# Patient Record
Sex: MALE | Race: OTHER | NOT HISPANIC OR LATINO | URBAN - METROPOLITAN AREA
[De-identification: names, ages, dates, MRNs, and addresses within clinical notes are randomized per-mention and may not be internally consistent; named-entity substitution may affect disease eponyms.]

---

## 2022-06-13 ENCOUNTER — HOSPITAL ENCOUNTER (INPATIENT)
Facility: HOSPITAL | Age: 42
LOS: 2 days | Discharge: HOME | DRG: 247 | End: 2022-06-15
Attending: EMERGENCY MEDICINE | Admitting: INTERNAL MEDICINE
Payer: COMMERCIAL

## 2022-06-13 DIAGNOSIS — R07.9 CHEST PAIN: ICD-10-CM

## 2022-06-13 DIAGNOSIS — Z72.0 TOBACCO ABUSE: Chronic | ICD-10-CM

## 2022-06-13 DIAGNOSIS — R73.9 HYPERGLYCEMIA, UNSPECIFIED: ICD-10-CM

## 2022-06-13 DIAGNOSIS — I21.3 ST ELEVATION MYOCARDIAL INFARCTION (STEMI), UNSPECIFIED ARTERY: ICD-10-CM

## 2022-06-13 DIAGNOSIS — I25.118 CORONARY ARTERY DISEASE OF NATIVE HEART WITH STABLE ANGINA PECTORIS, UNSPECIFIED VESSEL OR LESION TYPE: ICD-10-CM

## 2022-06-13 DIAGNOSIS — I25.10 CAD (CORONARY ARTERY DISEASE): ICD-10-CM

## 2022-06-13 DIAGNOSIS — I21.3 STEMI (ST ELEVATION MYOCARDIAL INFARCTION): ICD-10-CM

## 2022-06-13 DIAGNOSIS — I10 UNCONTROLLED HYPERTENSION: ICD-10-CM

## 2022-06-13 DIAGNOSIS — I21.02 ST ELEVATION MYOCARDIAL INFARCTION INVOLVING LEFT ANTERIOR DESCENDING (LAD) CORONARY ARTERY: ICD-10-CM

## 2022-06-13 LAB
ALBUMIN SERPL BCP-MCNC: 4 G/DL (ref 3.5–5.2)
ALBUMIN SERPL BCP-MCNC: 4.1 G/DL (ref 3.5–5.2)
ALP SERPL-CCNC: 61 U/L (ref 55–135)
ALP SERPL-CCNC: 65 U/L (ref 55–135)
ALT SERPL W/O P-5'-P-CCNC: 26 U/L (ref 10–44)
ALT SERPL W/O P-5'-P-CCNC: 30 U/L (ref 10–44)
AMPHET+METHAMPHET UR QL: NEGATIVE
ANION GAP SERPL CALC-SCNC: 12 MMOL/L (ref 8–16)
ANION GAP SERPL CALC-SCNC: 7 MMOL/L (ref 8–16)
AST SERPL-CCNC: 27 U/L (ref 10–40)
AST SERPL-CCNC: 82 U/L (ref 10–40)
BARBITURATES UR QL SCN>200 NG/ML: NEGATIVE
BASOPHILS # BLD AUTO: 0.04 K/UL (ref 0–0.2)
BASOPHILS # BLD AUTO: 0.04 K/UL (ref 0–0.2)
BASOPHILS NFR BLD: 0.3 % (ref 0–1.9)
BASOPHILS NFR BLD: 0.5 % (ref 0–1.9)
BENZODIAZ UR QL SCN>200 NG/ML: ABNORMAL
BILIRUB SERPL-MCNC: 0.3 MG/DL (ref 0.1–1)
BILIRUB SERPL-MCNC: 0.6 MG/DL (ref 0.1–1)
BILIRUB UR QL STRIP: NEGATIVE
BNP SERPL-MCNC: 55 PG/ML (ref 0–99)
BNP SERPL-MCNC: 80 PG/ML (ref 0–99)
BUN SERPL-MCNC: 13 MG/DL (ref 6–20)
BUN SERPL-MCNC: 16 MG/DL (ref 6–20)
BZE UR QL SCN: NEGATIVE
CALCIUM SERPL-MCNC: 9.5 MG/DL (ref 8.7–10.5)
CALCIUM SERPL-MCNC: 9.9 MG/DL (ref 8.7–10.5)
CANNABINOIDS UR QL SCN: NEGATIVE
CHLORIDE SERPL-SCNC: 104 MMOL/L (ref 95–110)
CHLORIDE SERPL-SCNC: 107 MMOL/L (ref 95–110)
CLARITY UR: CLEAR
CO2 SERPL-SCNC: 22 MMOL/L (ref 23–29)
CO2 SERPL-SCNC: 26 MMOL/L (ref 23–29)
COLOR UR: YELLOW
CREAT SERPL-MCNC: 0.8 MG/DL (ref 0.5–1.4)
CREAT SERPL-MCNC: 0.9 MG/DL (ref 0.5–1.4)
CREAT UR-MCNC: 18 MG/DL (ref 23–375)
DIFFERENTIAL METHOD: ABNORMAL
DIFFERENTIAL METHOD: NORMAL
EOSINOPHIL # BLD AUTO: 0.1 K/UL (ref 0–0.5)
EOSINOPHIL # BLD AUTO: 0.4 K/UL (ref 0–0.5)
EOSINOPHIL NFR BLD: 1.1 % (ref 0–8)
EOSINOPHIL NFR BLD: 5.4 % (ref 0–8)
ERYTHROCYTE [DISTWIDTH] IN BLOOD BY AUTOMATED COUNT: 12.4 % (ref 11.5–14.5)
ERYTHROCYTE [DISTWIDTH] IN BLOOD BY AUTOMATED COUNT: 12.5 % (ref 11.5–14.5)
EST. GFR  (AFRICAN AMERICAN): >60 ML/MIN/1.73 M^2
EST. GFR  (AFRICAN AMERICAN): >60 ML/MIN/1.73 M^2
EST. GFR  (NON AFRICAN AMERICAN): >60 ML/MIN/1.73 M^2
EST. GFR  (NON AFRICAN AMERICAN): >60 ML/MIN/1.73 M^2
GLUCOSE SERPL-MCNC: 101 MG/DL (ref 70–110)
GLUCOSE SERPL-MCNC: 222 MG/DL (ref 70–110)
GLUCOSE UR QL STRIP: NEGATIVE
HCT VFR BLD AUTO: 43.9 % (ref 40–54)
HCT VFR BLD AUTO: 44.9 % (ref 40–54)
HGB BLD-MCNC: 14.5 G/DL (ref 14–18)
HGB BLD-MCNC: 14.9 G/DL (ref 14–18)
HGB UR QL STRIP: ABNORMAL
IMM GRANULOCYTES # BLD AUTO: 0.02 K/UL (ref 0–0.04)
IMM GRANULOCYTES # BLD AUTO: 0.06 K/UL (ref 0–0.04)
IMM GRANULOCYTES NFR BLD AUTO: 0.3 % (ref 0–0.5)
IMM GRANULOCYTES NFR BLD AUTO: 0.5 % (ref 0–0.5)
KETONES UR QL STRIP: NEGATIVE
LEUKOCYTE ESTERASE UR QL STRIP: NEGATIVE
LYMPHOCYTES # BLD AUTO: 1.6 K/UL (ref 1–4.8)
LYMPHOCYTES # BLD AUTO: 2.8 K/UL (ref 1–4.8)
LYMPHOCYTES NFR BLD: 12.8 % (ref 18–48)
LYMPHOCYTES NFR BLD: 34.8 % (ref 18–48)
MCH RBC QN AUTO: 29.9 PG (ref 27–31)
MCH RBC QN AUTO: 29.9 PG (ref 27–31)
MCHC RBC AUTO-ENTMCNC: 33 G/DL (ref 32–36)
MCHC RBC AUTO-ENTMCNC: 33.2 G/DL (ref 32–36)
MCV RBC AUTO: 90 FL (ref 82–98)
MCV RBC AUTO: 91 FL (ref 82–98)
METHADONE UR QL SCN>300 NG/ML: NEGATIVE
MONOCYTES # BLD AUTO: 0.8 K/UL (ref 0.3–1)
MONOCYTES # BLD AUTO: 1 K/UL (ref 0.3–1)
MONOCYTES NFR BLD: 12 % (ref 4–15)
MONOCYTES NFR BLD: 6.1 % (ref 4–15)
NEUTROPHILS # BLD AUTO: 3.7 K/UL (ref 1.8–7.7)
NEUTROPHILS # BLD AUTO: 9.7 K/UL (ref 1.8–7.7)
NEUTROPHILS NFR BLD: 47 % (ref 38–73)
NEUTROPHILS NFR BLD: 79.2 % (ref 38–73)
NITRITE UR QL STRIP: NEGATIVE
NRBC BLD-RTO: 0 /100 WBC
NRBC BLD-RTO: 0 /100 WBC
OPIATES UR QL SCN: NEGATIVE
PCP UR QL SCN>25 NG/ML: NEGATIVE
PH UR STRIP: 7 [PH] (ref 5–8)
PLATELET # BLD AUTO: 241 K/UL (ref 150–450)
PLATELET # BLD AUTO: 262 K/UL (ref 150–450)
PMV BLD AUTO: 9.4 FL (ref 9.2–12.9)
PMV BLD AUTO: 9.9 FL (ref 9.2–12.9)
POC ACTIVATED CLOTTING TIME K: 213 SEC (ref 74–137)
POC ACTIVATED CLOTTING TIME K: 254 SEC (ref 74–137)
POC ACTIVATED CLOTTING TIME K: 265 SEC (ref 74–137)
POCT GLUCOSE: 81 MG/DL (ref 70–110)
POTASSIUM SERPL-SCNC: 3.6 MMOL/L (ref 3.5–5.1)
POTASSIUM SERPL-SCNC: 3.9 MMOL/L (ref 3.5–5.1)
PROT SERPL-MCNC: 7.1 G/DL (ref 6–8.4)
PROT SERPL-MCNC: 7.6 G/DL (ref 6–8.4)
PROT UR QL STRIP: NEGATIVE
RBC # BLD AUTO: 4.85 M/UL (ref 4.6–6.2)
RBC # BLD AUTO: 4.99 M/UL (ref 4.6–6.2)
SAMPLE: ABNORMAL
SARS-COV-2 RDRP RESP QL NAA+PROBE: NEGATIVE
SODIUM SERPL-SCNC: 138 MMOL/L (ref 136–145)
SODIUM SERPL-SCNC: 140 MMOL/L (ref 136–145)
SP GR UR STRIP: 1.01 (ref 1–1.03)
TOXICOLOGY INFORMATION: ABNORMAL
TROPONIN I SERPL DL<=0.01 NG/ML-MCNC: 0.81 NG/ML (ref 0–0.03)
TROPONIN I SERPL DL<=0.01 NG/ML-MCNC: 18.34 NG/ML (ref 0–0.03)
URN SPEC COLLECT METH UR: ABNORMAL
UROBILINOGEN UR STRIP-ACNC: NEGATIVE EU/DL
WBC # BLD AUTO: 12.28 K/UL (ref 3.9–12.7)
WBC # BLD AUTO: 7.9 K/UL (ref 3.9–12.7)

## 2022-06-13 PROCEDURE — 25000003 PHARM REV CODE 250: Performed by: INTERNAL MEDICINE

## 2022-06-13 PROCEDURE — 80307 DRUG TEST PRSMV CHEM ANLYZR: CPT | Performed by: EMERGENCY MEDICINE

## 2022-06-13 PROCEDURE — 85025 COMPLETE CBC W/AUTO DIFF WBC: CPT | Mod: 91 | Performed by: EMERGENCY MEDICINE

## 2022-06-13 PROCEDURE — 92929 PR STENT, ADD'L VESSEL: CPT | Mod: LD,,, | Performed by: INTERNAL MEDICINE

## 2022-06-13 PROCEDURE — 99223 PR INITIAL HOSPITAL CARE,LEVL III: ICD-10-PCS | Mod: ,,, | Performed by: INTERNAL MEDICINE

## 2022-06-13 PROCEDURE — C9606 PERC D-E COR REVASC W AMI S: HCPCS | Mod: LD | Performed by: INTERNAL MEDICINE

## 2022-06-13 PROCEDURE — 84484 ASSAY OF TROPONIN QUANT: CPT | Mod: 91 | Performed by: INTERNAL MEDICINE

## 2022-06-13 PROCEDURE — 92978 PR IVUS, CORONARY, 1ST VESSEL: ICD-10-PCS | Mod: 26,LD,, | Performed by: INTERNAL MEDICINE

## 2022-06-13 PROCEDURE — C1874 STENT, COATED/COV W/DEL SYS: HCPCS | Performed by: INTERNAL MEDICINE

## 2022-06-13 PROCEDURE — 96374 THER/PROPH/DIAG INJ IV PUSH: CPT

## 2022-06-13 PROCEDURE — 99285 EMERGENCY DEPT VISIT HI MDM: CPT | Mod: 25

## 2022-06-13 PROCEDURE — 93010 ELECTROCARDIOGRAM REPORT: CPT | Mod: ,,, | Performed by: INTERNAL MEDICINE

## 2022-06-13 PROCEDURE — 93010 EKG 12-LEAD: ICD-10-PCS | Mod: 76,,, | Performed by: INTERNAL MEDICINE

## 2022-06-13 PROCEDURE — C1725 CATH, TRANSLUMIN NON-LASER: HCPCS | Performed by: INTERNAL MEDICINE

## 2022-06-13 PROCEDURE — 96375 TX/PRO/DX INJ NEW DRUG ADDON: CPT

## 2022-06-13 PROCEDURE — 99223 1ST HOSP IP/OBS HIGH 75: CPT | Mod: ,,, | Performed by: INTERNAL MEDICINE

## 2022-06-13 PROCEDURE — 36415 COLL VENOUS BLD VENIPUNCTURE: CPT | Performed by: EMERGENCY MEDICINE

## 2022-06-13 PROCEDURE — C1894 INTRO/SHEATH, NON-LASER: HCPCS | Performed by: INTERNAL MEDICINE

## 2022-06-13 PROCEDURE — 27201423 OPTIME MED/SURG SUP & DEVICES STERILE SUPPLY: Performed by: INTERNAL MEDICINE

## 2022-06-13 PROCEDURE — C1887 CATHETER, GUIDING: HCPCS | Performed by: INTERNAL MEDICINE

## 2022-06-13 PROCEDURE — C1753 CATH, INTRAVAS ULTRASOUND: HCPCS | Performed by: INTERNAL MEDICINE

## 2022-06-13 PROCEDURE — 92941 PRQ TRLML REVSC TOT OCCL AMI: CPT | Mod: LD,,, | Performed by: INTERNAL MEDICINE

## 2022-06-13 PROCEDURE — 92978 ENDOLUMINL IVUS OCT C 1ST: CPT | Mod: 26,LD,, | Performed by: INTERNAL MEDICINE

## 2022-06-13 PROCEDURE — 20000000 HC ICU ROOM

## 2022-06-13 PROCEDURE — 36415 COLL VENOUS BLD VENIPUNCTURE: CPT | Performed by: INTERNAL MEDICINE

## 2022-06-13 PROCEDURE — 92941 PR AMI ANY METHOD: ICD-10-PCS | Mod: LD,,, | Performed by: INTERNAL MEDICINE

## 2022-06-13 PROCEDURE — 25500020 PHARM REV CODE 255: Performed by: INTERNAL MEDICINE

## 2022-06-13 PROCEDURE — 93458 PR CATH PLACE/CORON ANGIO, IMG SUPER/INTERP,W LEFT HEART VENTRICULOGRAPHY: ICD-10-PCS | Mod: 26,59,51, | Performed by: INTERNAL MEDICINE

## 2022-06-13 PROCEDURE — 93458 L HRT ARTERY/VENTRICLE ANGIO: CPT | Mod: 26,59,51, | Performed by: INTERNAL MEDICINE

## 2022-06-13 PROCEDURE — 99152 MOD SED SAME PHYS/QHP 5/>YRS: CPT | Mod: ,,, | Performed by: INTERNAL MEDICINE

## 2022-06-13 PROCEDURE — 93005 ELECTROCARDIOGRAM TRACING: CPT

## 2022-06-13 PROCEDURE — C1769 GUIDE WIRE: HCPCS | Performed by: INTERNAL MEDICINE

## 2022-06-13 PROCEDURE — U0002 COVID-19 LAB TEST NON-CDC: HCPCS | Performed by: EMERGENCY MEDICINE

## 2022-06-13 PROCEDURE — 99291 PR CRITICAL CARE, E/M 30-74 MINUTES: ICD-10-PCS | Mod: ,,, | Performed by: NURSE PRACTITIONER

## 2022-06-13 PROCEDURE — 25000242 PHARM REV CODE 250 ALT 637 W/ HCPCS: Performed by: EMERGENCY MEDICINE

## 2022-06-13 PROCEDURE — 99152 MOD SED SAME PHYS/QHP 5/>YRS: CPT | Performed by: INTERNAL MEDICINE

## 2022-06-13 PROCEDURE — 81003 URINALYSIS AUTO W/O SCOPE: CPT | Mod: 59 | Performed by: EMERGENCY MEDICINE

## 2022-06-13 PROCEDURE — 92978 ENDOLUMINL IVUS OCT C 1ST: CPT | Mod: LD | Performed by: INTERNAL MEDICINE

## 2022-06-13 PROCEDURE — 99152 PR MOD CONSCIOUS SEDATION, SAME PHYS, 5+ YRS, FIRST 15 MIN: ICD-10-PCS | Mod: ,,, | Performed by: INTERNAL MEDICINE

## 2022-06-13 PROCEDURE — 93458 L HRT ARTERY/VENTRICLE ANGIO: CPT | Mod: 59 | Performed by: INTERNAL MEDICINE

## 2022-06-13 PROCEDURE — 80053 COMPREHEN METABOLIC PANEL: CPT | Mod: 91 | Performed by: EMERGENCY MEDICINE

## 2022-06-13 PROCEDURE — 85347 COAGULATION TIME ACTIVATED: CPT | Performed by: INTERNAL MEDICINE

## 2022-06-13 PROCEDURE — 84484 ASSAY OF TROPONIN QUANT: CPT | Performed by: EMERGENCY MEDICINE

## 2022-06-13 PROCEDURE — 99291 CRITICAL CARE FIRST HOUR: CPT | Mod: ,,, | Performed by: NURSE PRACTITIONER

## 2022-06-13 PROCEDURE — 25000003 PHARM REV CODE 250: Performed by: EMERGENCY MEDICINE

## 2022-06-13 PROCEDURE — 63600175 PHARM REV CODE 636 W HCPCS: Mod: JG

## 2022-06-13 PROCEDURE — 93010 ELECTROCARDIOGRAM REPORT: CPT | Mod: 76,,, | Performed by: INTERNAL MEDICINE

## 2022-06-13 PROCEDURE — 92929 PR STENT, ADD'L VESSEL: ICD-10-PCS | Mod: LD,,, | Performed by: INTERNAL MEDICINE

## 2022-06-13 PROCEDURE — 83880 ASSAY OF NATRIURETIC PEPTIDE: CPT | Mod: 91 | Performed by: EMERGENCY MEDICINE

## 2022-06-13 PROCEDURE — 63600175 PHARM REV CODE 636 W HCPCS: Performed by: INTERNAL MEDICINE

## 2022-06-13 PROCEDURE — 25000003 PHARM REV CODE 250: Performed by: NURSE PRACTITIONER

## 2022-06-13 PROCEDURE — 99153 MOD SED SAME PHYS/QHP EA: CPT | Performed by: INTERNAL MEDICINE

## 2022-06-13 DEVICE — STENT RONYX40012UX RESOLUTE ONYX 4.00X12
Type: IMPLANTABLE DEVICE | Site: ARTERIAL | Status: FUNCTIONAL
Brand: RESOLUTE ONYX™

## 2022-06-13 DEVICE — STENT RONYX35030UX RESOLUTE ONYX 3.50X30
Type: IMPLANTABLE DEVICE | Site: ARTERIAL | Status: FUNCTIONAL
Brand: RESOLUTE ONYX™

## 2022-06-13 DEVICE — STENT RONYX30018UX RESOLUTE ONYX 3.00X18
Type: IMPLANTABLE DEVICE | Site: HEART | Status: FUNCTIONAL
Brand: RESOLUTE ONYX™

## 2022-06-13 RX ORDER — ENOXAPARIN SODIUM 100 MG/ML
40 INJECTION SUBCUTANEOUS EVERY 24 HOURS
Status: DISCONTINUED | OUTPATIENT
Start: 2022-06-14 | End: 2022-06-15 | Stop reason: HOSPADM

## 2022-06-13 RX ORDER — TIROFIBAN HYDROCHLORIDE 50 UG/ML
INJECTION INTRAVENOUS
Status: DISCONTINUED | OUTPATIENT
Start: 2022-06-13 | End: 2022-06-14

## 2022-06-13 RX ORDER — METOPROLOL TARTRATE 1 MG/ML
5 INJECTION, SOLUTION INTRAVENOUS
Status: COMPLETED | OUTPATIENT
Start: 2022-06-13 | End: 2022-06-13

## 2022-06-13 RX ORDER — BISACODYL 10 MG
10 SUPPOSITORY, RECTAL RECTAL DAILY PRN
Status: DISCONTINUED | OUTPATIENT
Start: 2022-06-13 | End: 2022-06-15 | Stop reason: HOSPADM

## 2022-06-13 RX ORDER — HEPARIN SODIUM 1000 [USP'U]/ML
INJECTION INTRAVENOUS; SUBCUTANEOUS
Status: DISCONTINUED | OUTPATIENT
Start: 2022-06-13 | End: 2022-06-13 | Stop reason: HOSPADM

## 2022-06-13 RX ORDER — FAMOTIDINE 20 MG/1
20 TABLET, FILM COATED ORAL 2 TIMES DAILY
Status: DISCONTINUED | OUTPATIENT
Start: 2022-06-13 | End: 2022-06-15 | Stop reason: HOSPADM

## 2022-06-13 RX ORDER — FENTANYL CITRATE 50 UG/ML
INJECTION, SOLUTION INTRAMUSCULAR; INTRAVENOUS
Status: DISCONTINUED | OUTPATIENT
Start: 2022-06-13 | End: 2022-06-13 | Stop reason: HOSPADM

## 2022-06-13 RX ORDER — IBUPROFEN 200 MG
16 TABLET ORAL
Status: DISCONTINUED | OUTPATIENT
Start: 2022-06-13 | End: 2022-06-15 | Stop reason: HOSPADM

## 2022-06-13 RX ORDER — HEPARIN SODIUM 5000 [USP'U]/ML
4000 INJECTION, SOLUTION INTRAVENOUS; SUBCUTANEOUS
Status: COMPLETED | OUTPATIENT
Start: 2022-06-13 | End: 2022-06-13

## 2022-06-13 RX ORDER — IODIXANOL 320 MG/ML
INJECTION, SOLUTION INTRAVASCULAR
Status: DISCONTINUED | OUTPATIENT
Start: 2022-06-13 | End: 2022-06-13 | Stop reason: HOSPADM

## 2022-06-13 RX ORDER — MUPIROCIN 20 MG/G
OINTMENT TOPICAL 2 TIMES DAILY
Status: DISCONTINUED | OUTPATIENT
Start: 2022-06-13 | End: 2022-06-15 | Stop reason: HOSPADM

## 2022-06-13 RX ORDER — ONDANSETRON 8 MG/1
8 TABLET, ORALLY DISINTEGRATING ORAL EVERY 8 HOURS PRN
Status: DISCONTINUED | OUTPATIENT
Start: 2022-06-13 | End: 2022-06-15 | Stop reason: HOSPADM

## 2022-06-13 RX ORDER — MIDAZOLAM HYDROCHLORIDE 1 MG/ML
INJECTION, SOLUTION INTRAMUSCULAR; INTRAVENOUS
Status: DISCONTINUED | OUTPATIENT
Start: 2022-06-13 | End: 2022-06-13 | Stop reason: HOSPADM

## 2022-06-13 RX ORDER — NITROGLYCERIN 0.4 MG/1
0.4 TABLET SUBLINGUAL EVERY 5 MIN PRN
Status: DISCONTINUED | OUTPATIENT
Start: 2022-06-13 | End: 2022-06-15 | Stop reason: HOSPADM

## 2022-06-13 RX ORDER — NITROGLYCERIN 5 MG/ML
INJECTION, SOLUTION INTRAVENOUS
Status: DISCONTINUED | OUTPATIENT
Start: 2022-06-13 | End: 2022-06-13 | Stop reason: HOSPADM

## 2022-06-13 RX ORDER — METOPROLOL TARTRATE 25 MG/1
25 TABLET, FILM COATED ORAL 2 TIMES DAILY
Status: DISCONTINUED | OUTPATIENT
Start: 2022-06-13 | End: 2022-06-15 | Stop reason: HOSPADM

## 2022-06-13 RX ORDER — TIROFIBAN HYDROCHLORIDE 50 UG/ML
0.15 INJECTION INTRAVENOUS CONTINUOUS
Status: DISCONTINUED | OUTPATIENT
Start: 2022-06-13 | End: 2022-06-14

## 2022-06-13 RX ORDER — SODIUM CHLORIDE 9 MG/ML
INJECTION, SOLUTION INTRAVENOUS CONTINUOUS
Status: DISCONTINUED | OUTPATIENT
Start: 2022-06-13 | End: 2022-06-13

## 2022-06-13 RX ORDER — DOCUSATE SODIUM 100 MG/1
100 CAPSULE, LIQUID FILLED ORAL EVERY OTHER DAY
Status: DISCONTINUED | OUTPATIENT
Start: 2022-06-14 | End: 2022-06-15 | Stop reason: HOSPADM

## 2022-06-13 RX ORDER — GLUCAGON 1 MG
1 KIT INJECTION
Status: DISCONTINUED | OUTPATIENT
Start: 2022-06-13 | End: 2022-06-15 | Stop reason: HOSPADM

## 2022-06-13 RX ORDER — INSULIN ASPART 100 [IU]/ML
1-10 INJECTION, SOLUTION INTRAVENOUS; SUBCUTANEOUS
Status: DISCONTINUED | OUTPATIENT
Start: 2022-06-13 | End: 2022-06-15 | Stop reason: HOSPADM

## 2022-06-13 RX ORDER — IBUPROFEN 200 MG
24 TABLET ORAL
Status: DISCONTINUED | OUTPATIENT
Start: 2022-06-13 | End: 2022-06-15 | Stop reason: HOSPADM

## 2022-06-13 RX ORDER — ACETAMINOPHEN 325 MG/1
650 TABLET ORAL EVERY 4 HOURS PRN
Status: DISCONTINUED | OUTPATIENT
Start: 2022-06-13 | End: 2022-06-15 | Stop reason: HOSPADM

## 2022-06-13 RX ORDER — ASPIRIN 81 MG/1
81 TABLET ORAL DAILY
Status: DISCONTINUED | OUTPATIENT
Start: 2022-06-14 | End: 2022-06-15 | Stop reason: HOSPADM

## 2022-06-13 RX ORDER — LABETALOL HYDROCHLORIDE 5 MG/ML
10 INJECTION, SOLUTION INTRAVENOUS
Status: DISCONTINUED | OUTPATIENT
Start: 2022-06-13 | End: 2022-06-13

## 2022-06-13 RX ORDER — SODIUM CHLORIDE 9 MG/ML
INJECTION, SOLUTION INTRAVENOUS CONTINUOUS
Status: DISCONTINUED | OUTPATIENT
Start: 2022-06-13 | End: 2022-06-14

## 2022-06-13 RX ADMIN — FAMOTIDINE 20 MG: 20 TABLET ORAL at 08:06

## 2022-06-13 RX ADMIN — HEPARIN SODIUM 4000 UNITS: 5000 INJECTION, SOLUTION INTRAVENOUS; SUBCUTANEOUS at 04:06

## 2022-06-13 RX ADMIN — METOPROLOL TARTRATE 5 MG: 1 INJECTION, SOLUTION INTRAVENOUS at 04:06

## 2022-06-13 RX ADMIN — METOPROLOL TARTRATE 25 MG: 25 TABLET, FILM COATED ORAL at 08:06

## 2022-06-13 RX ADMIN — NITROGLYCERIN 0.4 MG: 0.4 TABLET SUBLINGUAL at 04:06

## 2022-06-13 RX ADMIN — SODIUM CHLORIDE 1000 ML: 0.9 INJECTION, SOLUTION INTRAVENOUS at 04:06

## 2022-06-13 RX ADMIN — MUPIROCIN: 20 OINTMENT TOPICAL at 08:06

## 2022-06-13 RX ADMIN — SODIUM CHLORIDE: 0.9 INJECTION, SOLUTION INTRAVENOUS at 07:06

## 2022-06-13 NOTE — ED NOTES
Called pt's wife, Luther ([phone]). Luther's daughter answered and stated that they were in the surgery/radiology waiting room. This RN and Dr. Prieto went to surgery waiting room and spoke with pt's wife and family to provide update. HANNAH  (mandarin) was utilized -  [id number].

## 2022-06-13 NOTE — SUBJECTIVE & OBJECTIVE
History reviewed. No pertinent past medical history.    History reviewed. No pertinent surgical history.    Review of patient's allergies indicates:  No Known Allergies    No current facility-administered medications on file prior to encounter.     No current outpatient medications on file prior to encounter.     Family History       Problem Relation (Age of Onset)    Heart disease Mother          Tobacco Use    Smoking status: Current Every Day Smoker     Types: Cigarettes    Smokeless tobacco: Never Used   Substance and Sexual Activity    Alcohol use: Not on file    Drug use: Not on file    Sexual activity: Not on file     Review of Systems   Constitutional: Positive for malaise/fatigue.   Eyes:  Negative for blurred vision.   Cardiovascular:  Positive for chest pain. Negative for claudication, cyanosis, dyspnea on exertion, irregular heartbeat, leg swelling, near-syncope, orthopnea, palpitations and paroxysmal nocturnal dyspnea.   Respiratory:  Negative for cough, hemoptysis and shortness of breath.    Hematologic/Lymphatic: Negative for bleeding problem. Does not bruise/bleed easily.   Skin:  Negative for dry skin and itching.   Musculoskeletal:  Negative for falls, muscle weakness and myalgias.   Gastrointestinal:  Negative for abdominal pain, diarrhea, heartburn, hematemesis, hematochezia and melena.   Genitourinary:  Negative for flank pain and hematuria.   Neurological:  Negative for dizziness, focal weakness, headaches, light-headedness, numbness, paresthesias, seizures and weakness.   Psychiatric/Behavioral:  Negative for altered mental status and memory loss. The patient is not nervous/anxious.    Allergic/Immunologic: Negative for hives.   Objective:     Vital Signs (Most Recent):  Pulse: (!) 116 (06/13/22 1619)  Resp: (!) 24 (06/13/22 1619)  BP: (!) 176/101 (06/13/22 1619)  SpO2: 100 % (06/13/22 1619)   Vital Signs (24h Range):  Pulse:  [116] 116  Resp:  [24] 24  SpO2:  [100 %] 100 %  BP: (176)/(101)  176/101     Weight: 73.9 kg (162 lb 14.7 oz)  Body mass index is 24.06 kg/m².    SpO2: 100 %  O2 Device (Oxygen Therapy): nasal cannula    No intake or output data in the 24 hours ending 06/13/22 1757    Lines/Drains/Airways       Peripheral Intravenous Line  Duration                  Peripheral IV - Single Lumen 06/13/22 1600 18 G Left Antecubital <1 day         Peripheral IV - Single Lumen 06/13/22 1616 20 G Right Hand <1 day                    Physical Exam  Vitals and nursing note reviewed.   Constitutional:       General: He is in acute distress.      Appearance: He is well-developed. He is ill-appearing. He is not diaphoretic.   HENT:      Head: Normocephalic and atraumatic.   Eyes:      General:         Right eye: No discharge.         Left eye: No discharge.      Pupils: Pupils are equal, round, and reactive to light.   Neck:      Thyroid: No thyromegaly.      Vascular: No JVD.   Cardiovascular:      Rate and Rhythm: Normal rate and regular rhythm.      Pulses: Intact distal pulses.      Heart sounds: Normal heart sounds. No murmur heard.    No friction rub. No gallop.   Pulmonary:      Effort: Pulmonary effort is normal. No respiratory distress.      Breath sounds: Normal breath sounds. No wheezing or rales.   Chest:      Chest wall: No tenderness.   Abdominal:      General: Bowel sounds are normal. There is no distension.      Palpations: Abdomen is soft.      Tenderness: There is no abdominal tenderness.   Musculoskeletal:         General: Normal range of motion.      Cervical back: Neck supple.   Skin:     General: Skin is warm and dry.      Findings: No erythema or rash.   Neurological:      Mental Status: He is alert and oriented to person, place, and time.      Cranial Nerves: No cranial nerve deficit.   Psychiatric:         Behavior: Behavior normal.       Significant Labs:   Recent Lab Results         06/13/22  1728   06/13/22  1708   06/13/22  1647   06/13/22  1626        Albumin       4.1        Alkaline Phosphatase       61       ALT       26       Anion Gap       12       AST       27       Baso #       0.04       Basophil %       0.5       BILIRUBIN TOTAL       0.3  Comment: For infants and newborns, interpretation of results should be based  on gestational age, weight and in agreement with clinical  observations.    Premature Infant recommended reference ranges:  Up to 24 hours.............<8.0 mg/dL  Up to 48 hours............<12.0 mg/dL  3-5 days..................<15.0 mg/dL  6-29 days.................<15.0 mg/dL         BNP       55  Comment: Values of less than 100 pg/ml are consistent with non-CHF populations.       BUN       16       Calcium       9.9       Chloride       104       CO2       22       Creatinine       0.8       Differential Method       Automated       eGFR if        >60       eGFR if non        >60  Comment: Calculation used to obtain the estimated glomerular filtration  rate (eGFR) is the CKD-EPI equation.          Eos #       0.4       Eosinophil %       5.4       Glucose       222       Gran # (ANC)       3.7       Gran %       47.0       Hematocrit       44.9       Hemoglobin       14.9       Immature Grans (Abs)       0.02  Comment: Mild elevation in immature granulocytes is non specific and   can be seen in a variety of conditions including stress response,   acute inflammation, trauma and pregnancy. Correlation with other   laboratory and clinical findings is essential.         Immature Granulocytes       0.3       Lymph #       2.8       Lymph %       34.8       MCH       29.9       MCHC       33.2       MCV       90       Mono #       1.0       Mono %       12.0       MPV       9.9       nRBC       0       Platelets       241       POC ACTIVATED CLOTTING TIME K 254   213   265         Potassium       3.6       PROTEIN TOTAL       7.6       RBC       4.99       RDW       12.5       Sample unknown   unknown   unknown         SARS-CoV-2 RNA,  Amplification, Qual       Negative  Comment: This test utilizes isothermal nucleic acid amplification   technology to detect the SARS-CoV-2 RdRp nucleic acid segment.   The analytical sensitivity (limit of detection) is 125 genome   equivalents/mL.     A POSITIVE result implies infection with the SARS-CoV-2 virus;  the patient is presumed to be contagious.    A NEGATIVE result means that SARS-CoV-2 nucleic acids are not  present above the limit of detection. A NEGATIVE result should be   treated as presumptive. It does not rule out the possibility of   COVID-19 and should not be the sole basis for treatment decisions.   If COVID-19 is strongly suspected based on clinical and exposure   history, re-testing using an alternate molecular assay should be   considered.       This test is only for use under the Food and Drug   Administration s Emergency Use Authorization (EUA).   Commercial kits are provided by [employer].   Performance characteristics of the EUA have been independently  verified by [facility] Department of  Pathology and Laboratory Medicine.   _________________________________________________________________  The ID NOW COVID-19 Letter of Authorization, along with the   authorized Fact Sheet for Healthcare Providers, the authorized Fact  Sheet for Patients, and authorized labeling are available on the FDA   website:  www.fda.gov/MedicalDevices/Safety/EmergencySituations/dyb948913.htm         Sodium       138       Troponin I       0.807  Comment: The reference interval for Troponin I represents the 99th percentile   cutoff   for our facility and is consistent with 3rd generation assay   performance.         WBC       7.90               Significant Imaging: X-Ray: CXR: X-Ray Chest 1 View (CXR): No results found for this visit on 06/13/22.

## 2022-06-13 NOTE — Clinical Note
The catheter was inserted into the ostium   right coronary artery. Hemodynamics were performed.  Over guidewire

## 2022-06-13 NOTE — CONSULTS
[facility] - Intensive Care ([facility])  Critical Care Medicine  Consult Note    Patient Name: Kumar Rodriguez  MRN: [id number]  Admission Date: 6/13/2022  Hospital Length of Stay: 0 days  Code Status: No Order  Attending Physician: Thee Nichols MD   Primary Care Provider: Primary Doctor No   Principal Problem: ST elevation myocardial infarction involving left anterior descending (LAD) coronary artery    [unfilled]  Subjective:     HPI:  Mr Rodriguez is a 42 yo male with a PMH of tobacco abuse awakened at 0300 hr with sternal CP and presented this afternoon to [facility] given  mg and transferred via EMS to [facility] arriving about 1618 hr with EKG en route indicative of acute STEMI.  STEMI also on EKG in ED with /101.  The pt stated at 3AM this morning he woke up feeling like his chest was tight. He stated the medicine at [facility] helped elevate his pain and rated it 2 out of 10.  Symptoms are constant and moderate in severity. No mitigating or exacerbating factors reported. Associated sxs include none.  The pt stated he has no previous cardiac hx but reported his mother had a heart attack.  Cards consulted and taken emergently for LHC having stents X 2 to LAD and 1 to diagonal then admitted to ICU.       Hospital/ICU Course:  6/13 - Admitted to ICU from cath lab fully AA&OX3 in no distress and CP free on Aggrastat infusion and VSS supine in bed with Cath sheath still in place      History reviewed. No pertinent past medical history.    History reviewed. No pertinent surgical history.    Review of patient's allergies indicates:  No Known Allergies    Family History       Problem Relation (Age of Onset)    Heart disease Mother          Tobacco Use    Smoking status: Current Every Day Smoker     Types: Cigarettes    Smokeless tobacco: Never Used   Substance and Sexual Activity    Alcohol use: Not on file    Drug use: Not on file    Sexual activity: Not on file         Review of Systems    Constitutional:  Positive for fatigue. Negative for appetite change and diaphoresis.   HENT:  Negative for congestion.    Eyes:  Negative for discharge.   Respiratory:  Negative for cough, chest tightness, shortness of breath and wheezing.    Cardiovascular:  Negative for chest pain, palpitations and leg swelling.   Gastrointestinal:  Negative for abdominal distention, abdominal pain, blood in stool, nausea and vomiting.   Endocrine: Negative for polydipsia and polyuria.   Genitourinary:  Negative for difficulty urinating.   Musculoskeletal:  Negative for arthralgias.   Skin:  Negative for color change.   Allergic/Immunologic: Negative for immunocompromised state.   Neurological:  Negative for dizziness, weakness and headaches.   Hematological:  Does not bruise/bleed easily.   Psychiatric/Behavioral:  Negative for agitation, behavioral problems and confusion. The patient is nervous/anxious.    Objective:     Vital Signs (Most Recent):  Pulse: (!) 116 (06/13/22 1619)  Resp: (!) 24 (06/13/22 1619)  BP: (!) 176/101 (06/13/22 1619)  SpO2: 100 % (06/13/22 1619)   Vital Signs (24h Range):  Pulse:  [116] 116  Resp:  [24] 24  SpO2:  [100 %] 100 %  BP: (176)/(101) 176/101     Weight: 73.9 kg (162 lb 14.7 oz)  Body mass index is 24.06 kg/m².    No intake or output data in the 24 hours ending 06/13/22 1832    Physical Exam  Vitals and nursing note reviewed.   Constitutional:       General: He is awake. He is not in acute distress.     Appearance: Normal appearance. He is well-developed. He is not ill-appearing or toxic-appearing.      Interventions: He is not intubated.  HENT:      Head: Normocephalic and atraumatic.      Mouth/Throat:      Mouth: Mucous membranes are moist.   Eyes:      General: Lids are normal.      Pupils: Pupils are equal, round, and reactive to light.   Neck:      Trachea: Trachea normal.   Cardiovascular:      Rate and Rhythm: Normal rate and regular rhythm.      Pulses:           Radial pulses are 3+  on the right side and 3+ on the left side.        Dorsalis pedis pulses are 3+ on the right side and 3+ on the left side.      Heart sounds: Normal heart sounds.   Pulmonary:      Effort: Pulmonary effort is normal. No tachypnea, accessory muscle usage or respiratory distress. He is not intubated.      Breath sounds: Normal breath sounds.   Chest:      Chest wall: No deformity or tenderness.   Abdominal:      General: Bowel sounds are increased. There is no distension.      Palpations: Abdomen is soft.      Tenderness: There is no abdominal tenderness.   Genitourinary:     Penis: Normal.    Musculoskeletal:         General: Normal range of motion.      Cervical back: Normal range of motion.      Right lower leg: No edema.      Left lower leg: No edema.      Right foot: No deformity.      Left foot: No deformity.   Lymphadenopathy:      Cervical: No cervical adenopathy.   Skin:     General: Skin is warm and dry.      Capillary Refill: Capillary refill takes less than 2 seconds.      Findings: No rash.   Neurological:      General: No focal deficit present.      Mental Status: He is alert and oriented to person, place, and time.   Psychiatric:         Attention and Perception: Attention normal.         Mood and Affect: Mood normal.         Speech: Speech normal.         Behavior: Behavior normal. Behavior is cooperative.         Thought Content: Thought content normal.         Cognition and Memory: Cognition normal.         Judgment: Judgment normal.         Lines/Drains/Airways       Peripheral Intravenous Line  Duration                  Peripheral IV - Single Lumen 06/13/22 1600 18 G Left Antecubital <1 day         Peripheral IV - Single Lumen 06/13/22 1616 20 G Right Hand <1 day                    Significant Labs:    CBC/Anemia Profile:  Recent Labs   Lab 06/13/22  1626   WBC 7.90   HGB 14.9   HCT 44.9      MCV 90   RDW 12.5        Chemistries:  Recent Labs   Lab 06/13/22  1626      K 3.6       CO2 22*   BUN 16   CREATININE 0.8   CALCIUM 9.9   ALBUMIN 4.1   PROT 7.6   BILITOT 0.3   ALKPHOS 61   ALT 26   AST 27       Troponin:   Recent Labs   Lab 06/13/22  1626   TROPONINI 0.807*     All pertinent labs within the past 24 hours have been reviewed.        Assessment/Plan:     Cardiac/Vascular  * ST elevation myocardial infarction involving left anterior descending (LAD) coronary artery  Cards managing  S/P LHC with stents X 2 to LAD and 1 to diagonal   CP free post PCI  On Aggrastat infusion  Cont ASA and Ticagrelor  Start Lopressor now and statin tomorrow  Check lipid panel  Cont ICU cardiac monitoring overnight  Trend troponin  Check CXR    Uncontrolled hypertension  SBP 130s post PCI  Add Lopressor now and ARB later if needed.     Endocrine  Hyperglycemia, unspecified  Glucose 222 on admit  Add SSI  Check A1C    Other  Tobacco abuse  Encouraged tobacco cessation       Preventive Measures and Monitoring:   Stress Ulcer: Pepcid  Nutrition: Cardiac diet  Glucose control: SSI  Bowel prophylaxis:Colace and PRN Dulcolax  DVT prophylaxis: LMWH/SCDs  Hx CAD on B-Blocker: Lopressor  Head of Bed/Reposition: Elevate HOB and turn Q1-2 hours   Early Mobility: bed rest overnight  Code Status: Full    Counseling/Consultation:I have discussed the care of this patient in detail with the bedside nursing staff and Dr. Salter and Dr. Nichols    Critical Care Time: 58 minutes  Critical secondary to Patient has a condition that poses threat to life and bodily function: Acute Myocardial Infarction     Critical care was time spent personally by me on the following activities: development of treatment plan with patient or surrogate and bedside caregivers, discussions with consultants, evaluation of patient's response to treatment, examination of patient, ordering and performing treatments and interventions, ordering and review of laboratory studies, ordering and review of radiographic studies, pulse oximetry, re-evaluation of  patient's condition. This critical care time did not overlap with that of any other provider or involve time for any procedures.    Thank you for your consult. I will follow-up with patient. Please contact us if you have any additional questions.     Eliud Zheng NP  Critical Care Medicine  [facility] - Intensive Care ([facility])

## 2022-06-13 NOTE — ASSESSMENT & PLAN NOTE
Cards managing  S/P LHC with stents X 2 to LAD and 1 to diagonal   CP free post PCI  On Aggrastat infusion  Cont ASA and Ticagrelor  Start Lopressor now and statin tomorrow  Check lipid panel  Cont ICU cardiac monitoring overnight  Trend troponin  Check CXR

## 2022-06-13 NOTE — ED PROVIDER NOTES
SCRIBE #1 NOTE: I, Ioana Tinoco, am scribing for, and in the presence of, No att. providers found. I have scribed the entire note.       History     Chief Complaint   Patient presents with    Chest Pain     Sternal CP since 0300; Seen at [facility] PTA and 324mg ASA. EKG en route and at [facility] read STEMI.     Review of patient's allergies indicates:  No Known Allergies      History of Present Illness     HPI    6/13/2022, 4:18 PM  History obtained from the patient      History of Present Illness: Kumar Rodriguez is a 41 y.o. male patient who presents to the Emergency Department for evaluation of a possible STEMI. While en route the pt had an EKG that read STEMI. The pt states at 3AM this morning he woke up feeling like his chest was tight. The pt states he went to [facility] and they gave him 324mg ASA for his chest pain. He states the medicine helped elevate his pain and rates it 2 out of 10.  Symptoms are constant and moderate in severity. No mitigating or exacerbating factors reported. Associated sxs include none. Patient denies any weakness, numbness, dizziness, fever, leg swelling, N/V/D, neck pain, and all other sxs at this time. The pt states he has no previous cardiac hx but reports his mother had a heart attack. The pt states they are a smoker but deny any drug usage. No further complaints or concerns at this time.       Arrival mode: Personal vehicle  EMS      PCP: Primary Doctor No        Past Medical History:  Past Medical History:   Diagnosis Date    High cholesterol     HTN (hypertension)     Myocardial infarction        Past Surgical History:  Past Surgical History:   Procedure Laterality Date    CARDIAC CATHETERIZATION      LEFT HEART CATHETERIZATION Left 06/13/2022    Procedure: CATHETERIZATION, HEART, LEFT;  Surgeon: Thee Nichols MD;  Location: [facility] CATH LAB;  Service: Cardiology;  Laterality: Left;         Family History:  Family History   Problem Relation Age of Onset    Heart disease Mother        Social  History:  Social History     Tobacco Use    Smoking status: Former Smoker     Types: Cigarettes    Smokeless tobacco: Never Used   Substance and Sexual Activity    Alcohol use: Not Currently    Drug use: Not on file    Sexual activity: Not on file        Review of Systems     Review of Systems   Constitutional: Negative for fever.   HENT: Negative for sore throat.    Respiratory: Positive for chest tightness. Negative for shortness of breath.    Cardiovascular: Positive for chest pain. Negative for leg swelling.   Gastrointestinal: Negative for diarrhea, nausea and vomiting.   Genitourinary: Negative for dysuria.   Musculoskeletal: Negative for back pain and neck pain.   Skin: Negative for rash.   Neurological: Negative for dizziness, weakness and numbness.   Hematological: Does not bruise/bleed easily.   All other systems reviewed and are negative.     Physical Exam     Initial Vitals   BP Pulse Resp Temp SpO2   06/13/22 1619 06/13/22 1619 06/13/22 1619 06/13/22 1915 06/13/22 1619   (!) 176/101 (!) 116 (!) 24 98.4 °F (36.9 °C) 100 %      MAP       --                 Physical Exam  Nursing Notes and Vital Signs Reviewed.  Constitutional: Patient is in no apparent distress.  Head: Atraumatic. Normocephalic.  Eyes: PERRL. EOM intact. Conjunctivae are not pale. No scleral icterus.  ENT: Mucous membranes are moist. Oropharynx is clear and symmetric.    Neck: Supple. Full ROM. No lymphadenopathy.  Cardiovascular: Tachycardic. Regular rhythm. No murmurs, rubs, or gallops. Distal pulses are 2+ and symmetric.  Pulmonary/Chest: No respiratory distress. Clear to auscultation bilaterally. No wheezing or rales.  Abdominal: Soft and non-distended.  There is no tenderness.  No rebound, guarding, or rigidity. Good bowel sounds.  Genitourinary: No CVA tenderness  Musculoskeletal: Moves all extremities. No obvious deformities. No edema. No calf tenderness.  Skin: Warm and dry.  Neurological:  Alert, awake, and appropriate.   Normal speech.  No acute focal neurological deficits are appreciated.  Psychiatric: Normal affect. Good eye contact. Appropriate in content.     ED Course   Critical Care    Date/Time: 6/13/2022 6:21 PM  Performed by: Roberto Prieto Jr., MD  Authorized by: Roberto Prieto Jr., MD   Direct patient critical care time: 10 minutes  Additional history critical care time: 10 minutes  Ordering / reviewing critical care time: 10 minutes  Documentation critical care time: 10 minutes  Consulting other physicians critical care time: 10 minutes  Consult with family critical care time: 10 minutes  Total critical care time (exclusive of procedural time) : 60 minutes  Critical care time was exclusive of separately billable procedures and treating other patients and teaching time.  Critical care was necessary to treat or prevent imminent or life-threatening deterioration of the following conditions: cardiac failure and circulatory failure (STEMI).  Critical care was time spent personally by me on the following activities: blood draw for specimens, development of treatment plan with patient or surrogate, discussions with consultants, interpretation of cardiac output measurements, evaluation of patient's response to treatment, examination of patient, obtaining history from patient or surrogate, ordering and performing treatments and interventions, ordering and review of laboratory studies, ordering and review of radiographic studies, pulse oximetry, re-evaluation of patient's condition and review of old charts.        ED Vital Signs:  Vitals:    06/14/22 0740 06/14/22 1200 06/14/22 1600 06/14/22 1726   BP: 131/85 127/66 122/74 124/84   Pulse: 77  74 78   Resp: (!) 23  (!) 22 20   Temp: 98 °F (36.7 °C) 98.3 °F (36.8 °C) 98.2 °F (36.8 °C) 98.5 °F (36.9 °C)   TempSrc: Oral  Oral Oral   SpO2: 99%  100% 96%   Weight:       Height:        06/14/22 1855 06/14/22 1900 06/15/22 0011 06/15/22 0414   BP:  116/73 (!) 123/53 114/65   Pulse: 77 82 80  76   Resp: 18 20 20 20   Temp:  98.6 °F (37 °C) 98.4 °F (36.9 °C) 98.3 °F (36.8 °C)   TempSrc:  Oral Oral Oral   SpO2: 99% 98% 95% 96%   Weight:       Height:        06/15/22 0705 06/15/22 0732 06/15/22 0803 06/15/22 0900   BP:   109/80    Pulse: 82  81 82   Resp:   20    Temp:   97.7 °F (36.5 °C)    TempSrc:   Oral    SpO2:  97% (!) 94%    Weight:       Height:        06/15/22 1100 06/15/22 1152 06/15/22 1300   BP:  111/75    Pulse: 76 82 82   Resp:  20    Temp:  98.3 °F (36.8 °C)    TempSrc:  Oral    SpO2:  96%    Weight:      Height:            The EKG was ordered, reviewed, and independently interpreted by the ED provider.  Interpretation time: 16:09:06  Rate: 115 BPM  Rhythm: sinus tachycardia  Interpretation: ST elevation, consider anterolateral injury or acute infarct. Acute MI/ STEMI.             The Emergency Provider reviewed the vital signs and test results, which are outlined above.     ED Discussion     4:26 PM: Discussed case with Dr. Nichols (Cardiology). Dr. Nichols agrees with current care and management of pt and accepts admission.   Admitting Service: Hospital Medicine  Admitting Physician: Dr. Nichols  Admit to: ICU    4:37 PM: Re-evaluated pt. I have discussed test results, shared treatment plan, and the need for admission with patient and family at bedside. Pt and family express understanding at this time and agree with all information. All questions answered. Pt and family have no further questions or concerns at this time. Pt is ready for admit.             Medical Decision Making:   Clinical Tests:   Medical Tests: Ordered and Reviewed           ED Medication(s):  Medications   sodium chloride 0.9% bolus 1,000 mL (1,000 mLs Intravenous New Bag 6/13/22 1620)   metoprolol injection 5 mg (5 mg Intravenous Given 6/13/22 1622)   heparin (porcine) injection 4,000 Units (4,000 Units Intravenous Given 6/13/22 1621)   potassium bicarbonate disintegrating tablet 50 mEq (50 mEq Oral Given 6/14/22 1002)        Discharge Medication List as of 6/15/2022  1:21 PM      START taking these medications    Details   aspirin (ECOTRIN) 81 MG EC tablet Take 1 tablet (81 mg total) by mouth once daily., Starting Thu 6/16/2022, Until Fri 6/16/2023, Normal      atorvastatin (LIPITOR) 80 MG tablet Take 1 tablet (80 mg total) by mouth once daily., Starting Thu 6/16/2022, Until Fri 6/16/2023, Normal      losartan (COZAAR) 25 MG tablet Take 1 tablet (25 mg total) by mouth once daily., Starting Thu 6/16/2022, Until Fri 6/16/2023, Normal      metoprolol tartrate (LOPRESSOR) 25 MG tablet Take 1 tablet (25 mg total) by mouth 2 (two) times daily., Starting Wed 6/15/2022, Until Thu 6/15/2023, Normal      ticagrelor (BRILINTA) 90 mg tablet Take 1 tablet (90 mg total) by mouth 2 (two) times daily., Starting Wed 6/15/2022, Until Thu 6/15/2023, Normal              Follow-up Information     Norma Nichols MD Follow up in 1 week(s).    Specialties: Interventional Cardiology, Cardiology  Contact information:  [address]  [place] [id number] [phone]                             Scribe Attestation:   Scribe #1: I performed the above scribed service and the documentation accurately describes the services I performed. I attest to the accuracy of the note.     Attending:   Physician Attestation Statement for Scribe #1: I, No att. providers found, personally performed the services described in this documentation, as scribed by Ioana Tinoco, in my presence, and it is both accurate and complete.           Clinical Impression       ICD-10-CM ICD-9-CM   1. Chest pain  R07.9 786.50   2. ST elevation myocardial infarction (STEMI), unspecified artery  I21.3 410.90   3. STEMI (ST elevation myocardial infarction)  I21.3 410.90   4. CAD (coronary artery disease)  I25.10 414.00   5. ST elevation myocardial infarction involving left anterior descending (LAD) coronary artery  I21.02 410.10   6. Coronary artery disease of native heart with stable  angina pectoris, unspecified vessel or lesion type  I25.118 414.01     413.9   7. Hyperglycemia, unspecified  R73.9 790.29   8. Tobacco abuse  Z72.0 305.1   9. Uncontrolled hypertension  I10 401.9       Disposition:   Disposition: Admitted  Condition: Critical         Roberto Prieto Jr., MD  06/14/22 2238       Roberto Prieto Jr., MD  06/26/22 0822       Roberto Prieto Jr., MD  06/26/22 0879

## 2022-06-13 NOTE — ED NOTES
PT. TO CATH LAB VIA STRETCHER, ESCORTED BY THIS RN AND DR. LUCAS ON PORTABLE CARDIAC, BP, AND SPO2 MONITORS.

## 2022-06-13 NOTE — NURSING
Patient arrived from cath lab.  Aaox4. resp even/unlabored. 2+ pulses bilat radial & DP. SR on monitor without ectopy. No acute distress nooted. Family to bedside. Verbalized understanding of precautions r/t sheath to R groin. Safety in place

## 2022-06-13 NOTE — ASSESSMENT & PLAN NOTE
ANTERIOR STEMI LAD DISTRIBUTION FOR DIRECT PCI WILL USE STANDARD THERAPY WITH DUAL ANTIPLATELETS STATINS B BLOCKERS.   WILL OBTAIN ECHO

## 2022-06-13 NOTE — HPI
Mr Rodriguez is a 40 yo male with a PMH of tobacco abuse awakened at 0300 hr with sternal CP and presented this afternoon to [facility] given  mg and transferred via EMS to [facility] ED arriving about 1618 hr with EKG en route indicative of acute STEMI.  STEMI also on EKG in ED with /101.  The pt stated at 3AM this morning he woke up feeling like his chest was tight. He stated the medicine at [facility] helped elevate his pain and rated it 2 out of 10.  Symptoms are constant and moderate in severity. No mitigating or exacerbating factors reported. Associated sxs include none.  The pt stated he has no previous cardiac hx but reported his mother had a heart attack.  Cards consulted and taken emergently for LHC having stents X 2 to LAD and 1 to diagonal then admitted to ICU.

## 2022-06-13 NOTE — ED NOTES
Cath lab made aware:   1) Lab states that they cannot see any of the patient's lab orders and need us to print/bring requisitions to lab.  2) Pt. Needs pink top tube sent to lab for type/screen.

## 2022-06-13 NOTE — HOSPITAL COURSE
6/13 - Admitted to ICU from cath lab fully AA&OX3 in no distress and CP free on Aggrastat infusion and VSS supine in bed with Cath sheath still in place    6/14- No significant events overnight- cath sheath removed. Pt sitting supine in bed eating breakfast, NAD, denies any CP or SOB. Aggrastat infusing.

## 2022-06-13 NOTE — Clinical Note
The right groin and left radial was prepped. The site was prepped with ChloraPrep. The site was clipped. The patient was draped. The patient was positioned supine.

## 2022-06-13 NOTE — HPI
A 42 yo MALE SMOKER WITH FH CAD HAD CHEST PAIN FELT LIKE CRAMP TIGHTNESS WENT TO AFTER [facility] HAD EKG DONE SHOWED STEMI HE WAS BROUGHT TO ER BY AMBULANCE REPEAT EKG SHOWED ANTERIOR ACUTE ST ELEVATION HE WAS TAKEN EMERGENTLY TO THE CATH LAD FOR DIRECT PCI.HER RECEIVED ASA B BLOCKERS IV AND HEPARIN BOLUS AND IVF AND WAS TAKEN EMERGENTLY TO CATH LAB.

## 2022-06-13 NOTE — ASSESSMENT & PLAN NOTE
Cardiology managing  Day 1 LHC & PCI w/ 3 stents  ECHO performed today- pending  Currently denies any CP  Cont Aggrastat infusion per cards recs  Troponin improved from 18.343 > 17.052  Cont ASA, Ticagrelor, B-blocker  Low dose Statin added- recheck lipid panel in AM

## 2022-06-13 NOTE — OP NOTE
INPATIENT Operative Note         SUMMARY     Surgery Date: 6/13/2022     Surgeon(s) and Role:     * Thee Nichols MD - Primary    ASSISTANT:none    Pre-op Diagnosis:  ST elevation myocardial infarction (STEMI), unspecified artery [I21.3]      Post-op Diagnosis:  ST elevation myocardial infarction (STEMI), unspecified artery [I21.3]    Procedure(s) (LRB):  CATHETERIZATION, HEART, LEFT (Left)  IVUS, Coronary  Percutaneous coronary intervention (N/A)  STENT, DRUG ELUTING, MULTI VESSEL, CORONARY    COMPLICATION:none    Anesthesia: RN IV Sedation    Findings/Key Components:  Lad 99% mid treaTED WITH 2 STENTS WITH IVUS GUIDANCE  D1 90% TREATED WITH STENTING   NON OBS DIsease.  rca non obs diffuse disease.   pda 50%   lvedp 20  Estimated Blood Loss: < 50 ML.         SPECIMEN: NONE    Devices/Prostetics:resolute x3     PLAN:  Routine post stent care.

## 2022-06-13 NOTE — SUBJECTIVE & OBJECTIVE
History reviewed. No pertinent past medical history.    History reviewed. No pertinent surgical history.    Review of patient's allergies indicates:  No Known Allergies    Family History       Problem Relation (Age of Onset)    Heart disease Mother          Tobacco Use    Smoking status: Current Every Day Smoker     Types: Cigarettes    Smokeless tobacco: Never Used   Substance and Sexual Activity    Alcohol use: Not on file    Drug use: Not on file    Sexual activity: Not on file         Review of Systems   Constitutional:  Positive for fatigue. Negative for appetite change and diaphoresis.   HENT:  Negative for congestion.    Eyes:  Negative for discharge.   Respiratory:  Negative for cough, chest tightness, shortness of breath and wheezing.    Cardiovascular:  Negative for chest pain, palpitations and leg swelling.   Gastrointestinal:  Negative for abdominal distention, abdominal pain, blood in stool, nausea and vomiting.   Endocrine: Negative for polydipsia and polyuria.   Genitourinary:  Negative for difficulty urinating.   Musculoskeletal:  Negative for arthralgias.   Skin:  Negative for color change.   Allergic/Immunologic: Negative for immunocompromised state.   Neurological:  Negative for dizziness, weakness and headaches.   Hematological:  Does not bruise/bleed easily.   Psychiatric/Behavioral:  Negative for agitation, behavioral problems and confusion. The patient is nervous/anxious.    Objective:     Vital Signs (Most Recent):  Pulse: (!) 116 (06/13/22 1619)  Resp: (!) 24 (06/13/22 1619)  BP: (!) 176/101 (06/13/22 1619)  SpO2: 100 % (06/13/22 1619)   Vital Signs (24h Range):  Pulse:  [116] 116  Resp:  [24] 24  SpO2:  [100 %] 100 %  BP: (176)/(101) 176/101     Weight: 73.9 kg (162 lb 14.7 oz)  Body mass index is 24.06 kg/m².    No intake or output data in the 24 hours ending 06/13/22 1832    Physical Exam  Vitals and nursing note reviewed.   Constitutional:       General: He is awake. He is not in acute  distress.     Appearance: Normal appearance. He is well-developed. He is not ill-appearing or toxic-appearing.      Interventions: He is not intubated.  HENT:      Head: Normocephalic and atraumatic.      Mouth/Throat:      Mouth: Mucous membranes are moist.   Eyes:      General: Lids are normal.      Pupils: Pupils are equal, round, and reactive to light.   Neck:      Trachea: Trachea normal.   Cardiovascular:      Rate and Rhythm: Normal rate and regular rhythm.      Pulses:           Radial pulses are 3+ on the right side and 3+ on the left side.        Dorsalis pedis pulses are 3+ on the right side and 3+ on the left side.      Heart sounds: Normal heart sounds.   Pulmonary:      Effort: Pulmonary effort is normal. No tachypnea, accessory muscle usage or respiratory distress. He is not intubated.      Breath sounds: Normal breath sounds.   Chest:      Chest wall: No deformity or tenderness.   Abdominal:      General: Bowel sounds are increased. There is no distension.      Palpations: Abdomen is soft.      Tenderness: There is no abdominal tenderness.   Genitourinary:     Penis: Normal.    Musculoskeletal:         General: Normal range of motion.      Cervical back: Normal range of motion.      Right lower leg: No edema.      Left lower leg: No edema.      Right foot: No deformity.      Left foot: No deformity.   Lymphadenopathy:      Cervical: No cervical adenopathy.   Skin:     General: Skin is warm and dry.      Capillary Refill: Capillary refill takes less than 2 seconds.      Findings: No rash.   Neurological:      General: No focal deficit present.      Mental Status: He is alert and oriented to person, place, and time.   Psychiatric:         Attention and Perception: Attention normal.         Mood and Affect: Mood normal.         Speech: Speech normal.         Behavior: Behavior normal. Behavior is cooperative.         Thought Content: Thought content normal.         Cognition and Memory: Cognition  normal.         Judgment: Judgment normal.         Lines/Drains/Airways       Peripheral Intravenous Line  Duration                  Peripheral IV - Single Lumen 06/13/22 1600 18 G Left Antecubital <1 day         Peripheral IV - Single Lumen 06/13/22 1616 20 G Right Hand <1 day                    Significant Labs:    CBC/Anemia Profile:  Recent Labs   Lab 06/13/22  1626   WBC 7.90   HGB 14.9   HCT 44.9      MCV 90   RDW 12.5        Chemistries:  Recent Labs   Lab 06/13/22  1626      K 3.6      CO2 22*   BUN 16   CREATININE 0.8   CALCIUM 9.9   ALBUMIN 4.1   PROT 7.6   BILITOT 0.3   ALKPHOS 61   ALT 26   AST 27       Troponin:   Recent Labs   Lab 06/13/22  1626   TROPONINI 0.807*     All pertinent labs within the past 24 hours have been reviewed.

## 2022-06-13 NOTE — Clinical Note
290 ml of contrast were injected throughout the case. 0 mL of contrast was the total wasted during the case. 290 mL was the total amount used during the case.

## 2022-06-13 NOTE — H&P
[facility] ([facility])  Cardiology  History and Physical     Patient Name: Kumar Rodriguez  MRN: [id number]  Admission Date: 6/13/2022  Code Status: No Order   Attending Provider: No att. providers found   Primary Care Physician: Primary Doctor No  Principal Problem:STEMI (ST elevation myocardial infarction)    Patient information was obtained from ER records.     Subjective:     Chief Complaint:  CHEST PAIN 1 HR PRIOR TO PRESENTATION     HPI:  A 40 yo MALE SMOKER WITH FH CAD HAD CHEST PAIN FELT LIKE CRAMP TIGHTNESS WENT TO AFTER HOURS HAD EKG DONE SHOWED STEMI HE WAS BROUGHT TO ER BY AMBULANCE REPEAT EKG SHOWED ANTERIOR ACUTE ST ELEVATION HE WAS TAKEN EMERGENTLY TO THE CATH LAD FOR DIRECT PCI.HER RECEIVED ASA B BLOCKERS IV AND HEPARIN BOLUS AND IVF AND WAS TAKEN EMERGENTLY TO CATH LAB.      History reviewed. No pertinent past medical history.    History reviewed. No pertinent surgical history.    Review of patient's allergies indicates:  No Known Allergies    No current facility-administered medications on file prior to encounter.     No current outpatient medications on file prior to encounter.     Family History       Problem Relation (Age of Onset)    Heart disease Mother          Tobacco Use    Smoking status: Current Every Day Smoker     Types: Cigarettes    Smokeless tobacco: Never Used   Substance and Sexual Activity    Alcohol use: Not on file    Drug use: Not on file    Sexual activity: Not on file     Review of Systems   Constitutional: Positive for malaise/fatigue.   Eyes:  Negative for blurred vision.   Cardiovascular:  Positive for chest pain. Negative for claudication, cyanosis, dyspnea on exertion, irregular heartbeat, leg swelling, near-syncope, orthopnea, palpitations and paroxysmal nocturnal dyspnea.   Respiratory:  Negative for cough, hemoptysis and shortness of breath.    Hematologic/Lymphatic: Negative for bleeding problem. Does not bruise/bleed easily.   Skin:  Negative for dry skin and  itching.   Musculoskeletal:  Negative for falls, muscle weakness and myalgias.   Gastrointestinal:  Negative for abdominal pain, diarrhea, heartburn, hematemesis, hematochezia and melena.   Genitourinary:  Negative for flank pain and hematuria.   Neurological:  Negative for dizziness, focal weakness, headaches, light-headedness, numbness, paresthesias, seizures and weakness.   Psychiatric/Behavioral:  Negative for altered mental status and memory loss. The patient is not nervous/anxious.    Allergic/Immunologic: Negative for hives.   Objective:     Vital Signs (Most Recent):  Pulse: (!) 116 (06/13/22 1619)  Resp: (!) 24 (06/13/22 1619)  BP: (!) 176/101 (06/13/22 1619)  SpO2: 100 % (06/13/22 1619)   Vital Signs (24h Range):  Pulse:  [116] 116  Resp:  [24] 24  SpO2:  [100 %] 100 %  BP: (176)/(101) 176/101     Weight: 73.9 kg (162 lb 14.7 oz)  Body mass index is 24.06 kg/m².    SpO2: 100 %  O2 Device (Oxygen Therapy): nasal cannula    No intake or output data in the 24 hours ending 06/13/22 1757    Lines/Drains/Airways       Peripheral Intravenous Line  Duration                  Peripheral IV - Single Lumen 06/13/22 1600 18 G Left Antecubital <1 day         Peripheral IV - Single Lumen 06/13/22 1616 20 G Right Hand <1 day                    Physical Exam  Vitals and nursing note reviewed.   Constitutional:       General: He is in acute distress.      Appearance: He is well-developed. He is ill-appearing. He is not diaphoretic.   HENT:      Head: Normocephalic and atraumatic.   Eyes:      General:         Right eye: No discharge.         Left eye: No discharge.      Pupils: Pupils are equal, round, and reactive to light.   Neck:      Thyroid: No thyromegaly.      Vascular: No JVD.   Cardiovascular:      Rate and Rhythm: Normal rate and regular rhythm.      Pulses: Intact distal pulses.      Heart sounds: Normal heart sounds. No murmur heard.    No friction rub. No gallop.   Pulmonary:      Effort: Pulmonary effort is  normal. No respiratory distress.      Breath sounds: Normal breath sounds. No wheezing or rales.   Chest:      Chest wall: No tenderness.   Abdominal:      General: Bowel sounds are normal. There is no distension.      Palpations: Abdomen is soft.      Tenderness: There is no abdominal tenderness.   Musculoskeletal:         General: Normal range of motion.      Cervical back: Neck supple.   Skin:     General: Skin is warm and dry.      Findings: No erythema or rash.   Neurological:      Mental Status: He is alert and oriented to person, place, and time.      Cranial Nerves: No cranial nerve deficit.   Psychiatric:         Behavior: Behavior normal.       Significant Labs:   Recent Lab Results         06/13/22  1728   06/13/22  1708   06/13/22  1647   06/13/22  1626        Albumin       4.1       Alkaline Phosphatase       61       ALT       26       Anion Gap       12       AST       27       Baso #       0.04       Basophil %       0.5       BILIRUBIN TOTAL       0.3  Comment: For infants and newborns, interpretation of results should be based  on gestational age, weight and in agreement with clinical  observations.    Premature Infant recommended reference ranges:  Up to 24 hours.............<8.0 mg/dL  Up to 48 hours............<12.0 mg/dL  3-5 days..................<15.0 mg/dL  6-29 days.................<15.0 mg/dL         BNP       55  Comment: Values of less than 100 pg/ml are consistent with non-CHF populations.       BUN       16       Calcium       9.9       Chloride       104       CO2       22       Creatinine       0.8       Differential Method       Automated       eGFR if        >60       eGFR if non        >60  Comment: Calculation used to obtain the estimated glomerular filtration  rate (eGFR) is the CKD-EPI equation.          Eos #       0.4       Eosinophil %       5.4       Glucose       222       Gran # (ANC)       3.7       Gran %       47.0       Hematocrit        44.9       Hemoglobin       14.9       Immature Grans (Abs)       0.02  Comment: Mild elevation in immature granulocytes is non specific and   can be seen in a variety of conditions including stress response,   acute inflammation, trauma and pregnancy. Correlation with other   laboratory and clinical findings is essential.         Immature Granulocytes       0.3       Lymph #       2.8       Lymph %       34.8       MCH       29.9       MCHC       33.2       MCV       90       Mono #       1.0       Mono %       12.0       MPV       9.9       nRBC       0       Platelets       241       POC ACTIVATED CLOTTING TIME K 254   213   265         Potassium       3.6       PROTEIN TOTAL       7.6       RBC       4.99       RDW       12.5       Sample unknown   unknown   unknown         SARS-CoV-2 RNA, Amplification, Qual       Negative  Comment: This test utilizes isothermal nucleic acid amplification   technology to detect the SARS-CoV-2 RdRp nucleic acid segment.   The analytical sensitivity (limit of detection) is 125 genome   equivalents/mL.     A POSITIVE result implies infection with the SARS-CoV-2 virus;  the patient is presumed to be contagious.    A NEGATIVE result means that SARS-CoV-2 nucleic acids are not  present above the limit of detection. A NEGATIVE result should be   treated as presumptive. It does not rule out the possibility of   COVID-19 and should not be the sole basis for treatment decisions.   If COVID-19 is strongly suspected based on clinical and exposure   history, re-testing using an alternate molecular assay should be   considered.       This test is only for use under the Food and Drug   Administration s Emergency Use Authorization (EUA).   Commercial kits are provided by [employer].   Performance characteristics of the EUA have been independently  verified by [facility] Department of  Pathology and Laboratory Medicine.    _________________________________________________________________  The ID NOW COVID-19 Letter of Authorization, along with the   authorized Fact Sheet for Healthcare Providers, the authorized Fact  Sheet for Patients, and authorized labeling are available on the FDA   website:  www.fda.gov/MedicalDevices/Safety/EmergencySituations/wxe570235.htm         Sodium       138       Troponin I       0.807  Comment: The reference interval for Troponin I represents the 99th percentile   cutoff   for our facility and is consistent with 3rd generation assay   performance.         WBC       7.90               Significant Imaging: X-Ray: CXR: X-Ray Chest 1 View (CXR): No results found for this visit on 06/13/22.    Assessment and Plan:     * STEMI (ST elevation myocardial infarction)  ANTERIOR STEMI LAD DISTRIBUTION FOR DIRECT PCI WILL USE STANDARD THERAPY WITH DUAL ANTIPLATELETS STATINS B BLOCKERS.   WILL OBTAIN ECHO    Tobacco abuse  SMOKING CESSATION COUNSELING PERFORMED.     Uncontrolled hypertension  ON NO MEDICAL THERAPY WILL START B BLOCKERS.        VTE Risk Mitigation (From admission, onward)         Ordered     heparin, porcine (PF) injection  As needed (PRN)         06/13/22 4918                Norma Nichols MD  Cardiology   [facility] ([facility])

## 2022-06-14 LAB
ANION GAP SERPL CALC-SCNC: 10 MMOL/L (ref 8–16)
ANION GAP SERPL CALC-SCNC: 8 MMOL/L (ref 8–16)
AORTIC ROOT ANNULUS: 3.29 CM
ASCENDING AORTA: 3.49 CM
AV INDEX (PROSTH): 0.88
AV MEAN GRADIENT: 4 MMHG
AV PEAK GRADIENT: 7 MMHG
AV REGURGITATION PRESSURE HALF TIME: 922.62 MS
AV VALVE AREA: 2.66 CM2
AV VELOCITY RATIO: 0.92
BASOPHILS # BLD AUTO: 0.03 K/UL (ref 0–0.2)
BASOPHILS NFR BLD: 0.3 % (ref 0–1.9)
BSA FOR ECHO PROCEDURE: 1.87 M2
BUN SERPL-MCNC: 12 MG/DL (ref 6–20)
BUN SERPL-MCNC: 9 MG/DL (ref 6–20)
CALCIUM SERPL-MCNC: 9 MG/DL (ref 8.7–10.5)
CALCIUM SERPL-MCNC: 9.3 MG/DL (ref 8.7–10.5)
CHLORIDE SERPL-SCNC: 104 MMOL/L (ref 95–110)
CHLORIDE SERPL-SCNC: 109 MMOL/L (ref 95–110)
CHOLEST SERPL-MCNC: 176 MG/DL (ref 120–199)
CHOLEST/HDLC SERPL: 5.9 {RATIO} (ref 2–5)
CO2 SERPL-SCNC: 24 MMOL/L (ref 23–29)
CO2 SERPL-SCNC: 26 MMOL/L (ref 23–29)
CREAT SERPL-MCNC: 0.8 MG/DL (ref 0.5–1.4)
CREAT SERPL-MCNC: 0.8 MG/DL (ref 0.5–1.4)
CV ECHO LV RWT: 0.68 CM
DIFFERENTIAL METHOD: ABNORMAL
DOP CALC AO PEAK VEL: 1.31 M/S
DOP CALC AO VTI: 24.7 CM
DOP CALC LVOT AREA: 3 CM2
DOP CALC LVOT DIAMETER: 1.96 CM
DOP CALC LVOT PEAK VEL: 1.21 M/S
DOP CALC LVOT STROKE VOLUME: 65.74 CM3
DOP CALC RVOT PEAK VEL: 0.88 M/S
DOP CALC RVOT VTI: 16.6 CM
DOP CALCLVOT PEAK VEL VTI: 21.8 CM
E WAVE DECELERATION TIME: 188.55 MSEC
E/A RATIO: 0.98
E/E' RATIO: 7.71 M/S
ECHO LV POSTERIOR WALL: 1.33 CM (ref 0.6–1.1)
EJECTION FRACTION: 50 %
EOSINOPHIL # BLD AUTO: 0.3 K/UL (ref 0–0.5)
EOSINOPHIL NFR BLD: 3.3 % (ref 0–8)
ERYTHROCYTE [DISTWIDTH] IN BLOOD BY AUTOMATED COUNT: 12.5 % (ref 11.5–14.5)
EST. GFR  (AFRICAN AMERICAN): >60 ML/MIN/1.73 M^2
EST. GFR  (AFRICAN AMERICAN): >60 ML/MIN/1.73 M^2
EST. GFR  (NON AFRICAN AMERICAN): >60 ML/MIN/1.73 M^2
EST. GFR  (NON AFRICAN AMERICAN): >60 ML/MIN/1.73 M^2
ESTIMATED AVG GLUCOSE: 117 MG/DL (ref 68–131)
FRACTIONAL SHORTENING: 30 % (ref 28–44)
GLUCOSE SERPL-MCNC: 127 MG/DL (ref 70–110)
GLUCOSE SERPL-MCNC: 137 MG/DL (ref 70–110)
HBA1C MFR BLD: 5.7 % (ref 4–5.6)
HCT VFR BLD AUTO: 41.1 % (ref 40–54)
HDLC SERPL-MCNC: 30 MG/DL (ref 40–75)
HDLC SERPL: 17 % (ref 20–50)
HGB BLD-MCNC: 13.4 G/DL (ref 14–18)
IMM GRANULOCYTES # BLD AUTO: 0.03 K/UL (ref 0–0.04)
IMM GRANULOCYTES NFR BLD AUTO: 0.3 % (ref 0–0.5)
INTERVENTRICULAR SEPTUM: 1.31 CM (ref 0.6–1.1)
IVC DIAMETER: 1.8 CM
IVRT: 71.36 MSEC
LA MAJOR: 4.85 CM
LA MINOR: 3.25 CM
LDLC SERPL CALC-MCNC: 124.8 MG/DL (ref 63–159)
LEFT ATRIUM SIZE: 3.32 CM
LEFT INTERNAL DIMENSION IN SYSTOLE: 2.72 CM (ref 2.1–4)
LEFT VENTRICLE DIASTOLIC VOLUME INDEX: 35.26 ML/M2
LEFT VENTRICLE DIASTOLIC VOLUME: 65.94 ML
LEFT VENTRICLE MASS INDEX: 98 G/M2
LEFT VENTRICLE SYSTOLIC VOLUME INDEX: 14.7 ML/M2
LEFT VENTRICLE SYSTOLIC VOLUME: 27.58 ML
LEFT VENTRICULAR INTERNAL DIMENSION IN DIASTOLE: 3.9 CM (ref 3.5–6)
LEFT VENTRICULAR MASS: 183.98 G
LV LATERAL E/E' RATIO: 6.75 M/S
LV SEPTAL E/E' RATIO: 9 M/S
LVOT MG: 2.99 MMHG
LVOT MV: 0.82 CM/S
LYMPHOCYTES # BLD AUTO: 1.9 K/UL (ref 1–4.8)
LYMPHOCYTES NFR BLD: 18.5 % (ref 18–48)
MAGNESIUM SERPL-MCNC: 2 MG/DL (ref 1.6–2.6)
MCH RBC QN AUTO: 29.7 PG (ref 27–31)
MCHC RBC AUTO-ENTMCNC: 32.6 G/DL (ref 32–36)
MCV RBC AUTO: 91 FL (ref 82–98)
MONOCYTES # BLD AUTO: 1.2 K/UL (ref 0.3–1)
MONOCYTES NFR BLD: 11.7 % (ref 4–15)
MV PEAK A VEL: 0.83 M/S
MV PEAK E VEL: 0.81 M/S
MV STENOSIS PRESSURE HALF TIME: 54.68 MS
MV VALVE AREA P 1/2 METHOD: 4.02 CM2
NEUTROPHILS # BLD AUTO: 6.7 K/UL (ref 1.8–7.7)
NEUTROPHILS NFR BLD: 65.9 % (ref 38–73)
NONHDLC SERPL-MCNC: 146 MG/DL
NRBC BLD-RTO: 0 /100 WBC
PHOSPHATE SERPL-MCNC: 3 MG/DL (ref 2.7–4.5)
PISA AR MAX VEL: 3.94 M/S
PISA TR MAX VEL: 2.61 M/S
PLATELET # BLD AUTO: 234 K/UL (ref 150–450)
PMV BLD AUTO: 9.7 FL (ref 9.2–12.9)
POC ACTIVATED CLOTTING TIME K: 138 SEC (ref 74–137)
POC ACTIVATED CLOTTING TIME K: 155 SEC (ref 74–137)
POCT GLUCOSE: 133 MG/DL (ref 70–110)
POCT GLUCOSE: 138 MG/DL (ref 70–110)
POCT GLUCOSE: 145 MG/DL (ref 70–110)
POTASSIUM SERPL-SCNC: 3.6 MMOL/L (ref 3.5–5.1)
POTASSIUM SERPL-SCNC: 4.2 MMOL/L (ref 3.5–5.1)
PV MEAN GRADIENT: 1.77 MMHG
RA MAJOR: 4.14 CM
RA PRESSURE: 8 MMHG
RA WIDTH: 3.76 CM
RBC # BLD AUTO: 4.51 M/UL (ref 4.6–6.2)
SAMPLE: ABNORMAL
SAMPLE: ABNORMAL
SINUS: 3.67 CM
SODIUM SERPL-SCNC: 140 MMOL/L (ref 136–145)
SODIUM SERPL-SCNC: 141 MMOL/L (ref 136–145)
STJ: 3.51 CM
TDI LATERAL: 0.12 M/S
TDI SEPTAL: 0.09 M/S
TDI: 0.11 M/S
TR MAX PG: 27 MMHG
TRICUSPID ANNULAR PLANE SYSTOLIC EXCURSION: 1.9 CM
TRIGL SERPL-MCNC: 106 MG/DL (ref 30–150)
TROPONIN I SERPL DL<=0.01 NG/ML-MCNC: 17.05 NG/ML (ref 0–0.03)
TV REST PULMONARY ARTERY PRESSURE: 35 MMHG
WBC # BLD AUTO: 10.22 K/UL (ref 3.9–12.7)

## 2022-06-14 PROCEDURE — 25000003 PHARM REV CODE 250

## 2022-06-14 PROCEDURE — 99232 SBSQ HOSP IP/OBS MODERATE 35: CPT | Mod: 25,,, | Performed by: INTERNAL MEDICINE

## 2022-06-14 PROCEDURE — 36415 COLL VENOUS BLD VENIPUNCTURE: CPT

## 2022-06-14 PROCEDURE — 99291 CRITICAL CARE FIRST HOUR: CPT | Mod: ,,,

## 2022-06-14 PROCEDURE — 83735 ASSAY OF MAGNESIUM: CPT | Performed by: INTERNAL MEDICINE

## 2022-06-14 PROCEDURE — 93010 ELECTROCARDIOGRAM REPORT: CPT | Mod: ,,, | Performed by: INTERNAL MEDICINE

## 2022-06-14 PROCEDURE — 99232 PR SUBSEQUENT HOSPITAL CARE,LEVL II: ICD-10-PCS | Mod: 25,,, | Performed by: INTERNAL MEDICINE

## 2022-06-14 PROCEDURE — 83036 HEMOGLOBIN GLYCOSYLATED A1C: CPT | Performed by: NURSE PRACTITIONER

## 2022-06-14 PROCEDURE — 25000003 PHARM REV CODE 250: Performed by: INTERNAL MEDICINE

## 2022-06-14 PROCEDURE — 93010 EKG 12-LEAD: ICD-10-PCS | Mod: ,,, | Performed by: INTERNAL MEDICINE

## 2022-06-14 PROCEDURE — 84100 ASSAY OF PHOSPHORUS: CPT | Performed by: INTERNAL MEDICINE

## 2022-06-14 PROCEDURE — 80048 BASIC METABOLIC PNL TOTAL CA: CPT | Mod: 91 | Performed by: INTERNAL MEDICINE

## 2022-06-14 PROCEDURE — 84484 ASSAY OF TROPONIN QUANT: CPT | Performed by: INTERNAL MEDICINE

## 2022-06-14 PROCEDURE — 63600175 PHARM REV CODE 636 W HCPCS: Performed by: NURSE PRACTITIONER

## 2022-06-14 PROCEDURE — 99291 PR CRITICAL CARE, E/M 30-74 MINUTES: ICD-10-PCS | Mod: ,,,

## 2022-06-14 PROCEDURE — 80061 LIPID PANEL: CPT | Performed by: NURSE PRACTITIONER

## 2022-06-14 PROCEDURE — 25000003 PHARM REV CODE 250: Performed by: NURSE PRACTITIONER

## 2022-06-14 PROCEDURE — 85025 COMPLETE CBC W/AUTO DIFF WBC: CPT | Performed by: INTERNAL MEDICINE

## 2022-06-14 PROCEDURE — 93005 ELECTROCARDIOGRAM TRACING: CPT

## 2022-06-14 PROCEDURE — 94761 N-INVAS EAR/PLS OXIMETRY MLT: CPT

## 2022-06-14 PROCEDURE — 36415 COLL VENOUS BLD VENIPUNCTURE: CPT | Performed by: INTERNAL MEDICINE

## 2022-06-14 PROCEDURE — 21400001 HC TELEMETRY ROOM

## 2022-06-14 PROCEDURE — 80048 BASIC METABOLIC PNL TOTAL CA: CPT

## 2022-06-14 RX ORDER — ATORVASTATIN CALCIUM 10 MG/1
20 TABLET, FILM COATED ORAL DAILY
Status: DISCONTINUED | OUTPATIENT
Start: 2022-06-14 | End: 2022-06-14

## 2022-06-14 RX ORDER — LOSARTAN POTASSIUM 25 MG/1
25 TABLET ORAL DAILY
Status: DISCONTINUED | OUTPATIENT
Start: 2022-06-15 | End: 2022-06-15 | Stop reason: HOSPADM

## 2022-06-14 RX ORDER — ATORVASTATIN CALCIUM 40 MG/1
40 TABLET, FILM COATED ORAL DAILY
Status: DISCONTINUED | OUTPATIENT
Start: 2022-06-14 | End: 2022-06-14

## 2022-06-14 RX ORDER — METOPROLOL TARTRATE 25 MG/1
25 TABLET, FILM COATED ORAL 2 TIMES DAILY
Status: DISCONTINUED | OUTPATIENT
Start: 2022-06-14 | End: 2022-06-14 | Stop reason: SDUPTHER

## 2022-06-14 RX ORDER — ATORVASTATIN CALCIUM 40 MG/1
80 TABLET, FILM COATED ORAL DAILY
Status: DISCONTINUED | OUTPATIENT
Start: 2022-06-15 | End: 2022-06-15 | Stop reason: HOSPADM

## 2022-06-14 RX ADMIN — ENOXAPARIN SODIUM 40 MG: 100 INJECTION SUBCUTANEOUS at 05:06

## 2022-06-14 RX ADMIN — MUPIROCIN: 20 OINTMENT TOPICAL at 08:06

## 2022-06-14 RX ADMIN — FAMOTIDINE 20 MG: 20 TABLET ORAL at 08:06

## 2022-06-14 RX ADMIN — TICAGRELOR 90 MG: 90 TABLET ORAL at 09:06

## 2022-06-14 RX ADMIN — FAMOTIDINE 20 MG: 20 TABLET ORAL at 09:06

## 2022-06-14 RX ADMIN — ATORVASTATIN CALCIUM 40 MG: 40 TABLET, FILM COATED ORAL at 10:06

## 2022-06-14 RX ADMIN — DOCUSATE SODIUM 100 MG: 100 CAPSULE, LIQUID FILLED ORAL at 08:06

## 2022-06-14 RX ADMIN — TICAGRELOR 90 MG: 90 TABLET ORAL at 08:06

## 2022-06-14 RX ADMIN — POTASSIUM BICARBONATE 50 MEQ: 977.5 TABLET, EFFERVESCENT ORAL at 10:06

## 2022-06-14 RX ADMIN — ASPIRIN 81 MG: 81 TABLET, COATED ORAL at 08:06

## 2022-06-14 RX ADMIN — MUPIROCIN: 20 OINTMENT TOPICAL at 09:06

## 2022-06-14 RX ADMIN — METOPROLOL TARTRATE 25 MG: 25 TABLET, FILM COATED ORAL at 08:06

## 2022-06-14 RX ADMIN — METOPROLOL TARTRATE 25 MG: 25 TABLET, FILM COATED ORAL at 09:06

## 2022-06-14 RX ADMIN — SODIUM CHLORIDE: 0.9 INJECTION, SOLUTION INTRAVENOUS at 01:06

## 2022-06-14 NOTE — SUBJECTIVE & OBJECTIVE
Review of Systems   Constitutional:  Negative for chills, diaphoresis and fever.   Respiratory:  Negative for shortness of breath.    Cardiovascular:  Negative for chest pain, palpitations and leg swelling.   Gastrointestinal:  Negative for abdominal pain, nausea and vomiting.   Genitourinary:  Negative for dysuria and flank pain.   Musculoskeletal:  Negative for myalgias.   Skin:  Negative for itching and rash.   Neurological:  Negative for dizziness.      Objective:     Vital Signs (Most Recent):  Temp: 98 °F (36.7 °C) (06/14/22 0740)  Pulse: 77 (06/14/22 0740)  Resp: (!) 23 (06/14/22 0740)  BP: 131/85 (06/14/22 0740)  SpO2: 99 % (06/14/22 0740)   Vital Signs (24h Range):  Temp:  [98 °F (36.7 °C)-98.4 °F (36.9 °C)] 98 °F (36.7 °C)  Pulse:  [] 77  Resp:  [9-29] 23  SpO2:  [96 %-100 %] 99 %  BP: (119-177)/() 131/85     Weight: 71.5 kg (157 lb 10.1 oz)  Body mass index is 23.28 kg/m².      Intake/Output Summary (Last 24 hours) at 6/14/2022 0828  Last data filed at 6/14/2022 0618  Gross per 24 hour   Intake 1046.92 ml   Output 2150 ml   Net -1103.08 ml      tirofiban-0.9% sodium chloride 12.5 mg/250ml 0.15 mcg/kg/min (06/13/22 1640)    tirofiban-0.9% sodium chloride 12.5 mg/250ml 0.15 mcg/kg/min (06/14/22 0618)      Physical Exam  Vitals and nursing note reviewed.   Constitutional:       General: He is not in acute distress.     Appearance: He is not ill-appearing.       Cardiovascular:      Rate and Rhythm: Normal rate and regular rhythm.      Pulses:           Radial pulses are 3+ on the right side and 3+ on the left side.        Dorsalis pedis pulses are 3+ on the right side and 3+ on the left side.   Pulmonary:      Effort: No respiratory distress.      Breath sounds: Normal breath sounds and air entry.   Abdominal:      General: There is no distension.      Tenderness: There is no abdominal tenderness.   Musculoskeletal:      Right lower leg: No edema.      Left lower leg: No edema.   Skin:      General: Skin is warm and dry.      Capillary Refill: Capillary refill takes less than 2 seconds.   Neurological:      Mental Status: He is alert.   Psychiatric:         Behavior: Behavior is cooperative.       Vents:       Lines/Drains/Airways       Peripheral Intravenous Line  Duration                  Peripheral IV - Single Lumen 06/13/22 1600 18 G Left Antecubital <1 day         Peripheral IV - Single Lumen 06/13/22 1616 20 G Right Hand <1 day                    Significant Labs:    CBC/Anemia Profile:  Recent Labs   Lab 06/13/22 1626 06/13/22 1922 06/14/22  0608   WBC 7.90 12.28 10.22   HGB 14.9 14.5 13.4*   HCT 44.9 43.9 41.1    262 234   MCV 90 91 91   RDW 12.5 12.4 12.5        Chemistries:  Recent Labs   Lab 06/13/22 1626 06/13/22 1922 06/14/22  0608    140 141   K 3.6 3.9 3.6    107 109   CO2 22* 26 24   BUN 16 13 12   CREATININE 0.8 0.9 0.8   CALCIUM 9.9 9.5 9.0   ALBUMIN 4.1 4.0  --    PROT 7.6 7.1  --    BILITOT 0.3 0.6  --    ALKPHOS 61 65  --    ALT 26 30  --    AST 27 82*  --    MG  --   --  2.0   PHOS  --   --  3.0       Bilirubin:   Recent Labs   Lab 06/13/22 1626 06/13/22 1922   BILITOT 0.3 0.6     POCT Glucose:   Recent Labs   Lab 06/13/22 2047 06/14/22  0745   POCTGLUCOSE 81 138*     All pertinent labs within the past 24 hours have been reviewed.    Significant Imaging:  I have reviewed all pertinent imaging results/findings within the past 24 hours.

## 2022-06-14 NOTE — PROGRESS NOTES
[facility] - [facility] Care ([facility])  Critical Care Medicine  Progress Note    Patient Name: Kumar Rodriguez  MRN: [id number]  Admission Date: 6/13/2022  Hospital Length of Stay: 1 days  Code Status: Full Code  Attending Provider: Thee Nichols MD  Primary Care Provider: Primary Doctor No   Principal Problem: ST elevation myocardial infarction involving left anterior descending (LAD) coronary artery    Subjective:     HPI:  Mr Rodriguez is a 42 yo male with a PMH of tobacco abuse awakened at 0300 hr with sternal CP and presented this afternoon to [facility] given  mg and transferred via EMS to [facility] arriving about 1618 hr with EKG en route indicative of acute STEMI.  STEMI also on EKG in ED with /101.  The pt stated at 3AM this morning he woke up feeling like his chest was tight. He stated the medicine at [facility] helped elevate his pain and rated it 2 out of 10.  Symptoms are constant and moderate in severity. No mitigating or exacerbating factors reported. Associated sxs include none.  The pt stated he has no previous cardiac hx but reported his mother had a heart attack.  Cards consulted and taken emergently for LHC having stents X 2 to LAD and 1 to diagonal then admitted to ICU.       Hospital/ICU Course:  6/13 - Admitted to ICU from cath lab fully AA&OX3 in no distress and CP free on Aggrastat infusion and VSS supine in bed with Cath sheath still in place    6/14- No significant events overnight- cath sheath removed. Pt sitting supine in bed eating breakfast, NAD, denies any CP or SOB. Aggrastat infusing.      Review of Systems   Constitutional:  Negative for chills, diaphoresis and fever.   Respiratory:  Negative for shortness of breath.    Cardiovascular:  Negative for chest pain, palpitations and leg swelling.   Gastrointestinal:  Negative for abdominal pain, nausea and vomiting.   Genitourinary:  Negative for dysuria and flank pain.   Musculoskeletal:  Negative for myalgias.    Skin:  Negative for itching and rash.   Neurological:  Negative for dizziness.      Objective:     Vital Signs (Most Recent):  Temp: 98 °F (36.7 °C) (06/14/22 0740)  Pulse: 77 (06/14/22 0740)  Resp: (!) 23 (06/14/22 0740)  BP: 131/85 (06/14/22 0740)  SpO2: 99 % (06/14/22 0740)   Vital Signs (24h Range):  Temp:  [98 °F (36.7 °C)-98.4 °F (36.9 °C)] 98 °F (36.7 °C)  Pulse:  [] 77  Resp:  [9-29] 23  SpO2:  [96 %-100 %] 99 %  BP: (119-177)/() 131/85     Weight: 71.5 kg (157 lb 10.1 oz)  Body mass index is 23.28 kg/m².      Intake/Output Summary (Last 24 hours) at 6/14/2022 0828  Last data filed at 6/14/2022 0618  Gross per 24 hour   Intake 1046.92 ml   Output 2150 ml   Net -1103.08 ml      tirofiban-0.9% sodium chloride 12.5 mg/250ml 0.15 mcg/kg/min (06/13/22 1640)    tirofiban-0.9% sodium chloride 12.5 mg/250ml 0.15 mcg/kg/min (06/14/22 0618)      Physical Exam  Vitals and nursing note reviewed.   Constitutional:       General: He is not in acute distress.     Appearance: He is not ill-appearing.       Cardiovascular:      Rate and Rhythm: Normal rate and regular rhythm.      Pulses:           Radial pulses are 3+ on the right side and 3+ on the left side.        Dorsalis pedis pulses are 3+ on the right side and 3+ on the left side.   Pulmonary:      Effort: No respiratory distress.      Breath sounds: Normal breath sounds and air entry.   Abdominal:      General: There is no distension.      Tenderness: There is no abdominal tenderness.   Musculoskeletal:      Right lower leg: No edema.      Left lower leg: No edema.   Skin:     General: Skin is warm and dry.      Capillary Refill: Capillary refill takes less than 2 seconds.   Neurological:      Mental Status: He is alert.   Psychiatric:         Behavior: Behavior is cooperative.       Vents:       Lines/Drains/Airways       Peripheral Intravenous Line  Duration                  Peripheral IV - Single Lumen 06/13/22 1600 18 G Left Antecubital <1 day          Peripheral IV - Single Lumen 06/13/22 1616 20 G Right Hand <1 day                    Significant Labs:    CBC/Anemia Profile:  Recent Labs   Lab 06/13/22 1626 06/13/22 1922 06/14/22  0608   WBC 7.90 12.28 10.22   HGB 14.9 14.5 13.4*   HCT 44.9 43.9 41.1    262 234   MCV 90 91 91   RDW 12.5 12.4 12.5        Chemistries:  Recent Labs   Lab 06/13/22 1626 06/13/22 1922 06/14/22  0608    140 141   K 3.6 3.9 3.6    107 109   CO2 22* 26 24   BUN 16 13 12   CREATININE 0.8 0.9 0.8   CALCIUM 9.9 9.5 9.0   ALBUMIN 4.1 4.0  --    PROT 7.6 7.1  --    BILITOT 0.3 0.6  --    ALKPHOS 61 65  --    ALT 26 30  --    AST 27 82*  --    MG  --   --  2.0   PHOS  --   --  3.0       Bilirubin:   Recent Labs   Lab 06/13/22 1626 06/13/22 1922   BILITOT 0.3 0.6     POCT Glucose:   Recent Labs   Lab 06/13/22 2047 06/14/22  0745   POCTGLUCOSE 81 138*     All pertinent labs within the past 24 hours have been reviewed.    Significant Imaging:  I have reviewed all pertinent imaging results/findings within the past 24 hours.      ABG  No results for input(s): PH, PO2, PCO2, HCO3, BE in the last 168 hours.  Assessment/Plan:     Cardiac/Vascular  * ST elevation myocardial infarction involving left anterior descending (LAD) coronary artery  Cardiology managing  Day 1 LHC & PCI w/ 3 stents  ECHO performed today- pending  Currently denies any CP  Cont Aggrastat infusion until 18 hrs completed per cards recs  Troponin improved from 18.343 > 17.052  Cont ASA, Ticagrelor, B-blocker  Low dose Statin added- recheck lipid panel in AM      Uncontrolled hypertension  BP stable s/p PCI  Cont B-blocker as tolerated     Endocrine  Hyperglycemia, unspecified  Glucose 222 on admit  Add SSI  Check A1C    Other  Tobacco abuse  Pt verbalizes understanding about importance of cessation     Critical Care Daily Checklist:    A: Awake: RASS Goal/Actual Goal:    Actual: Doherty Agitation Sedation Scale (RASS): Alert and calm   B: Spontaneous  Breathing Trial Performed?     C: SAT & SBT Coordinated?  n/a                      D: Delirium: CAM-ICU     E: Early Mobility Performed? Yes   F: Feeding Goal:    Status:     Current Diet Order   Procedures    Diet diabetic [facility]; 2000 Calorie; Cardiac (Low Na/Chol)     Order Specific Question:   Indicate patient location for additional diet options:     Answer:   [facility]     Order Specific Question:   Total calories:     Answer:   2000 Calorie     Order Specific Question:   Additional Diet Options:     Answer:   Cardiac (Low Na/Chol)      AS: Analgesia/Sedation Reviewed   T: Thromboembolic Prophylaxis Lovenox/SCDs   H: HOB > 300 Yes   U: Stress Ulcer Prophylaxis (if needed) pepcid   G: Glucose Control Stable   B: Bowel Function  Colace/ PRN Dulcolax   I: Indwelling Catheter (Lines & Pearl) Necessity Reviewed   D: De-escalation of Antimicrobials/Pharmacotherapies Reviewed    Plan for the day/ETD See note    Code Status:  Family/Goals of Care: Full Code  Spoke with patient regarding goals of care   I have discussed case and plan of care in detail with Dr. Salter; Status and plan of care were discussed with team on multidisciplinary rounds.    Plan to sign off and transfer to floor pending cardiology evaluation.   Critical Care Time: 66 minutes  Critical secondary to Patient has a condition that poses threat to life and bodily function: Acute Myocardial Infarction       Critical care was time spent personally by me on the following activities: development of treatment plan with patient or surrogate and bedside caregivers, discussions with consultants, evaluation of patient's response to treatment, examination of patient, ordering and performing treatments and interventions, ordering and review of laboratory studies, ordering and review of radiographic studies, pulse oximetry, re-evaluation of patient's condition. This critical care time did not overlap with that of any other provider or involve  time for any procedures.     Sun Muro NP  Critical Care Medicine  [facility] ([facility])

## 2022-06-14 NOTE — ASSESSMENT & PLAN NOTE
Cardiology managing  Day 1 LHC & PCI w/ 3 stents  ECHO performed today- pending  Currently denies any CP  Cont Aggrastat infusion until 18 hrs completed per cards recs  Troponin improved from 18.343 > 17.052  Cont ASA, Ticagrelor, B-blocker  Low dose Statin added- recheck lipid panel in AM

## 2022-06-14 NOTE — PLAN OF CARE
06/14/22 1535   Discharge Assessment   Assessment Type Discharge Planning Assessment   Confirmed/corrected address, phone number and insurance Yes   Confirmed Demographics Correct on Facesheet   Source of Information patient   Communicated KADEEM with patient/caregiver Date not available/Unable to determine   Reason For Admission ST elevation myocardial infarcation involving left anterior descending (LAD) coronary artery   Lives With spouse;child(nalini), adult   Facility Arrived From: Home   Do you expect to return to your current living situation? Yes   Do you have help at home or someone to help you manage your care at home? Yes   Who are your caregiver(s) and their phone number(s)? Pt's spouse and children   Prior to hospitilization cognitive status: Alert/Oriented   Current cognitive status: Alert/Oriented   Walking or Climbing Stairs Difficulty none   Dressing/Bathing Difficulty none   Equipment Currently Used at Home none   Readmission within 30 days? No   Patient currently being followed by outpatient case management? No   Do you currently have service(s) that help you manage your care at home? No   Do you take prescription medications? No   Do you have prescription coverage? Yes   Do you have any problems affording any of your prescribed medications? TBD   Who is going to help you get home at discharge? Pt's spouse   How do you get to doctors appointments? car, drives self;family or friend will provide   Are you on dialysis? No   Do you take coumadin? No   Discharge Plan A Home;Home with family   DME Needed Upon Discharge  none   Discharge Plan discussed with: Patient   Discharge Barriers Identified None     Swer met with patient at the bedside regarding discharge planning assessment. Patient reports living at home with spouse and children. Patient reports being previously independent with ADLs. Patient works and drives. Patient states that his family will be help at home and provide discharge  transportation.    Swer discussed PCP set up. Patient reports having new insurance and needing to get established with a new provider. Patient state he tried to call his insurance but was told he needed to provide his member ID. Patient states that his insurance card is at home. He believes there is an assigned PCP on insurance card. Swer provided patient with member ID from [employer]. Swer explained to patient how to search for PCP on insurance website or how to call. Patient verbalized understanding regarding this information.     Swer provided patient with SW contact information for any d/c needs. Swer to continue following.

## 2022-06-14 NOTE — SUBJECTIVE & OBJECTIVE
Review of Systems   Constitutional: Negative.   HENT: Negative.     Eyes: Negative.    Cardiovascular: Negative.    Respiratory: Negative.     Endocrine: Negative.    Hematologic/Lymphatic: Negative.    Skin: Negative.    Musculoskeletal: Negative.    Gastrointestinal: Negative.    Genitourinary: Negative.    Neurological: Negative.    Psychiatric/Behavioral: Negative.     Allergic/Immunologic: Negative.    Objective:     Vital Signs (Most Recent):  Temp: 98 °F (36.7 °C) (06/14/22 0740)  Pulse: 77 (06/14/22 0740)  Resp: (!) 23 (06/14/22 0740)  BP: 131/85 (06/14/22 0740)  SpO2: 99 % (06/14/22 0740)   Vital Signs (24h Range):  Temp:  [98 °F (36.7 °C)-98.4 °F (36.9 °C)] 98 °F (36.7 °C)  Pulse:  [] 77  Resp:  [9-29] 23  SpO2:  [96 %-100 %] 99 %  BP: (119-177)/() 131/85     Weight: 71.5 kg (157 lb 10.1 oz)  Body mass index is 23.28 kg/m².     SpO2: 99 %  O2 Device (Oxygen Therapy): nasal cannula      Intake/Output Summary (Last 24 hours) at 6/14/2022 1150  Last data filed at 6/14/2022 1000  Gross per 24 hour   Intake 1046.92 ml   Output 3000 ml   Net -1953.08 ml       Lines/Drains/Airways       Peripheral Intravenous Line  Duration                  Peripheral IV - Single Lumen 06/13/22 1600 18 G Left Antecubital <1 day                    Physical Exam  Vitals and nursing note reviewed.   Constitutional:       General: He is not in acute distress.     Appearance: Normal appearance. He is well-developed. He is not diaphoretic.   HENT:      Head: Normocephalic and atraumatic.   Eyes:      General:         Right eye: No discharge.         Left eye: No discharge.      Pupils: Pupils are equal, round, and reactive to light.   Neck:      Thyroid: No thyromegaly.      Vascular: No JVD.      Trachea: No tracheal deviation.   Cardiovascular:      Rate and Rhythm: Normal rate and regular rhythm.      Heart sounds: Normal heart sounds, S1 normal and S2 normal. No murmur heard.  Pulmonary:      Effort: Pulmonary  effort is normal. No respiratory distress.      Breath sounds: Normal breath sounds. No wheezing or rales.   Abdominal:      General: There is no distension.      Tenderness: There is no rebound.   Musculoskeletal:      Cervical back: Neck supple.   Skin:     General: Skin is warm and dry.      Findings: No erythema.      Comments: Right groin with mild bruising, no hematoma, intact pulse   Neurological:      General: No focal deficit present.      Mental Status: He is alert and oriented to person, place, and time.   Psychiatric:         Mood and Affect: Mood normal.         Behavior: Behavior normal.         Thought Content: Thought content normal.       Significant Labs: CMP   Recent Labs   Lab 06/13/22 1626 06/13/22 1922 06/14/22  0608    140 141   K 3.6 3.9 3.6    107 109   CO2 22* 26 24   * 101 137*   BUN 16 13 12   CREATININE 0.8 0.9 0.8   CALCIUM 9.9 9.5 9.0   PROT 7.6 7.1  --    ALBUMIN 4.1 4.0  --    BILITOT 0.3 0.6  --    ALKPHOS 61 65  --    AST 27 82*  --    ALT 26 30  --    ANIONGAP 12 7* 8   ESTGFRAFRICA >60 >60 >60   EGFRNONAA >60 >60 >60   , CBC   Recent Labs   Lab 06/13/22 1626 06/13/22 1922 06/14/22  0608   WBC 7.90 12.28 10.22   HGB 14.9 14.5 13.4*   HCT 44.9 43.9 41.1    262 234   , Troponin   Recent Labs   Lab 06/13/22 1626 06/13/22 1922 06/14/22  0608   TROPONINI 0.807* 18.343* 17.052*   , and All pertinent lab results from the last 24 hours have been reviewed.    Significant Imaging: Echocardiogram: Transthoracic echo (TTE) complete (Cupid Only):   Results for orders placed or performed during the hospital encounter of 06/13/22   Echo   Result Value Ref Range    BSA 1.87 m2    TDI SEPTAL 0.09 m/s    LV LATERAL E/E' RATIO 6.75 m/s    LV SEPTAL E/E' RATIO 9.00 m/s    IVC diameter 1.80 cm    Left Ventricular Outflow Tract Mean Velocity 0.44852788943 cm/s    Left Ventricular Outflow Tract Mean Gradient 2.99 mmHg    TDI LATERAL 0.12 m/s    LVIDd 3.90 3.5 - 6.0 cm     IVS 1.31 (A) 0.6 - 1.1 cm    Posterior Wall 1.33 (A) 0.6 - 1.1 cm    Ao root annulus 3.29 cm    LVIDs 2.72 2.1 - 4.0 cm    FS 30 28 - 44 %    Sinus 3.67 cm    STJ 3.51 cm    Ascending aorta 3.49 cm    LV mass 183.98 g    LA size 3.32 cm    TAPSE 1.90 cm    Left Ventricle Relative Wall Thickness 0.68 cm    AV regurgitation pressure 1/2 time 922.956451222796949 ms    AV mean gradient 4 mmHg    AV valve area 2.66 cm2    AV Velocity Ratio 0.92     AV index (prosthetic) 0.88     MV valve area p 1/2 method 4.02 cm2    E/A ratio 0.98     Mean e' 0.11 m/s    E wave deceleration time 188.229332772014347 msec    IVRT 71.346546660223446 msec    LVOT diameter 1.96 cm    LVOT area 3.0 cm2    LVOT peak josias 1.21 m/s    LVOT peak VTI 21.80 cm    Ao peak josias 1.31 m/s    Ao VTI 24.7 cm    RVOT peak josias 0.88 m/s    RVOT peak VTI 16.6 cm    LVOT stroke volume 65.74 cm3    AV peak gradient 7 mmHg    PV mean gradient 1.77 mmHg    E/E' ratio 7.71 m/s    MV Peak E Josias 0.81 m/s    AR Max Josias 3.94 m/s    TR Max Josias 2.61 m/s    MV stenosis pressure 1/2 time 54.893632710167372 ms    MV Peak A Josias 0.83 m/s    LV Systolic Volume 27.58 mL    LV Systolic Volume Index 14.7 mL/m2    LV Diastolic Volume 65.94 mL    LV Diastolic Volume Index 35.26 mL/m2    LV Mass Index 98 g/m2    RA Major Axis 4.14 cm    Left Atrium Minor Axis 3.25 cm    Left Atrium Major Axis 4.85 cm    Triscuspid Valve Regurgitation Peak Gradient 27 mmHg    RA Width 3.76 cm    Right Atrial Pressure (from IVC) 8 mmHg    EF 50 %    TV rest pulmonary artery pressure 35 mmHg    Narrative    · The left ventricle is normal in size with concentric remodeling and low   normal systolic function.  · The estimated ejection fraction is 50%.  · Normal left ventricular diastolic function.  · There are segmental left ventricular wall motion abnormalities.  · Normal right ventricular size.  · Intermediate central venous pressure (8 mmHg).  · The estimated PA systolic pressure is 35 mmHg.      ,  EKG: Reviewed, and X-Ray: CXR: X-Ray Chest 1 View (CXR):   Results for orders placed or performed during the hospital encounter of 06/13/22   X-Ray Chest 1 View    Narrative    EXAMINATION:  XR CHEST 1 VIEW    CLINICAL HISTORY:  CP w/ STEMI;    TECHNIQUE:  Single frontal view of the chest was performed.    COMPARISON:  None    FINDINGS:  The lungs are clear, with normal appearance of pulmonary vasculature and no pleural effusion or pneumothorax.    The cardiac silhouette is borderline enlarged.  The hilar and mediastinal contours are unremarkable.    Bones are intact.      Impression    Borderline cardiomegaly.    No acute pulmonary opacity.      Electronically signed by: Nghia Richmond  Date:    06/13/2022  Time:    19:50    and X-Ray Chest PA and Lateral (CXR): No results found for this visit on 06/13/22.

## 2022-06-14 NOTE — PROGRESS NOTES
"Pt transferred to room 208 via wheelchair from ICU. Pt ambulated to bed. NADN. Blood pressure 124/84, pulse 78, temperature 98.5 °F (36.9 °C), temperature source Oral, resp. rate 20, height 5' 9" (1.753 m), weight 71.5 kg (157 lb 10.1 oz), SpO2 96 %.      "

## 2022-06-14 NOTE — PLAN OF CARE
Problem: Skin Injury Risk Increased  Goal: Skin Health and Integrity  Outcome: Ongoing, Progressing   Patient admitted to tele room 208 this shift from ICU via stretcher.  Plan of care reviewed with patient, pt verbalized understanding.  Pt remains free from falls this shift, fall precautions in place.  Pt remains free from skin breakdown.  AAOx4, VSS, NAD noted at this time.  Pt remained afebrile.  NSR on the tele monitor.  Pt currently resting comfortably in bed.  Hourly rounding complete.  Cards following pt.  Will continue to monitor.

## 2022-06-14 NOTE — PROGRESS NOTES
Report received/ care assumed. Chart and labs reviewed. Denies pain. Skin assessment wnl. R groin site wnl. Bedrest complete. Diet advanced. Call light and safety maintained.

## 2022-06-14 NOTE — HOSPITAL COURSE
6/14/22-Patient seen and examined today, resting in bed s/p [facility] with successful PCI of LAD and diagonal. No chest pain. No SOB. No other CV complaints. Labs stable. Echo showed EF of 50%.

## 2022-06-14 NOTE — NURSING
Right groin sheath pulled at 2255. Held pressure to site for 15 min. No bruises or hematoma noted.

## 2022-06-14 NOTE — PROGRESS NOTES
[facility] - Intensive Care ([facility])  Cardiology  Progress Note    Patient Name: Kumar Rodriguez  MRN: [id number]  Admission Date: 6/13/2022  Hospital Length of Stay: 1 days  Code Status: Full Code   Attending Physician: Thee Nichols MD   Primary Care Physician: Primary Doctor No  Expected Discharge Date:   Principal Problem:ST elevation myocardial infarction involving left anterior descending (LAD) coronary artery    Subjective:   HPI:  A 42 yo MALE SMOKER WITH FH CAD HAD CHEST PAIN FELT LIKE CRAMP TIGHTNESS WENT TO AFTER HOURS HAD EKG DONE SHOWED STEMI HE WAS BROUGHT TO ER BY AMBULANCE REPEAT EKG SHOWED ANTERIOR ACUTE ST ELEVATION HE WAS TAKEN EMERGENTLY TO THE CATH LAD FOR DIRECT PCI.HER RECEIVED ASA B BLOCKERS IV AND HEPARIN BOLUS AND IVF AND WAS TAKEN EMERGENTLY TO CATH LAB.    Hospital Course:   6/14/22-Patient seen and examined today, resting in bed s/p [facility] with successful PCI of LAD and diagonal. No chest pain. No SOB. No other CV complaints. Labs stable. Echo showed EF of 50%.           Review of Systems   Constitutional: Negative.   HENT: Negative.     Eyes: Negative.    Cardiovascular: Negative.    Respiratory: Negative.     Endocrine: Negative.    Hematologic/Lymphatic: Negative.    Skin: Negative.    Musculoskeletal: Negative.    Gastrointestinal: Negative.    Genitourinary: Negative.    Neurological: Negative.    Psychiatric/Behavioral: Negative.     Allergic/Immunologic: Negative.    Objective:     Vital Signs (Most Recent):  Temp: 98 °F (36.7 °C) (06/14/22 0740)  Pulse: 77 (06/14/22 0740)  Resp: (!) 23 (06/14/22 0740)  BP: 131/85 (06/14/22 0740)  SpO2: 99 % (06/14/22 0740)   Vital Signs (24h Range):  Temp:  [98 °F (36.7 °C)-98.4 °F (36.9 °C)] 98 °F (36.7 °C)  Pulse:  [] 77  Resp:  [9-29] 23  SpO2:  [96 %-100 %] 99 %  BP: (119-177)/() 131/85     Weight: 71.5 kg (157 lb 10.1 oz)  Body mass index is 23.28 kg/m².     SpO2: 99 %  O2 Device (Oxygen Therapy): nasal cannula      Intake/Output Summary  (Last 24 hours) at 6/14/2022 1150  Last data filed at 6/14/2022 1000  Gross per 24 hour   Intake 1046.92 ml   Output 3000 ml   Net -1953.08 ml       Lines/Drains/Airways       Peripheral Intravenous Line  Duration                  Peripheral IV - Single Lumen 06/13/22 1600 18 G Left Antecubital <1 day                    Physical Exam  Vitals and nursing note reviewed.   Constitutional:       General: He is not in acute distress.     Appearance: Normal appearance. He is well-developed. He is not diaphoretic.   HENT:      Head: Normocephalic and atraumatic.   Eyes:      General:         Right eye: No discharge.         Left eye: No discharge.      Pupils: Pupils are equal, round, and reactive to light.   Neck:      Thyroid: No thyromegaly.      Vascular: No JVD.      Trachea: No tracheal deviation.   Cardiovascular:      Rate and Rhythm: Normal rate and regular rhythm.      Heart sounds: Normal heart sounds, S1 normal and S2 normal. No murmur heard.  Pulmonary:      Effort: Pulmonary effort is normal. No respiratory distress.      Breath sounds: Normal breath sounds. No wheezing or rales.   Abdominal:      General: There is no distension.      Tenderness: There is no rebound.   Musculoskeletal:      Cervical back: Neck supple.   Skin:     General: Skin is warm and dry.      Findings: No erythema.      Comments: Right groin with mild bruising, no hematoma, intact pulse   Neurological:      General: No focal deficit present.      Mental Status: He is alert and oriented to person, place, and time.   Psychiatric:         Mood and Affect: Mood normal.         Behavior: Behavior normal.         Thought Content: Thought content normal.       Significant Labs: CMP   Recent Labs   Lab 06/13/22  1626 06/13/22  1922 06/14/22  0608    140 141   K 3.6 3.9 3.6    107 109   CO2 22* 26 24   * 101 137*   BUN 16 13 12   CREATININE 0.8 0.9 0.8   CALCIUM 9.9 9.5 9.0   PROT 7.6 7.1  --    ALBUMIN 4.1 4.0  --    BILITOT  0.3 0.6  --    ALKPHOS 61 65  --    AST 27 82*  --    ALT 26 30  --    ANIONGAP 12 7* 8   ESTGFRAFRICA >60 >60 >60   EGFRNONAA >60 >60 >60   , CBC   Recent Labs   Lab 06/13/22  1626 06/13/22  1922 06/14/22  0608   WBC 7.90 12.28 10.22   HGB 14.9 14.5 13.4*   HCT 44.9 43.9 41.1    262 234   , Troponin   Recent Labs   Lab 06/13/22  1626 06/13/22  1922 06/14/22  0608   TROPONINI 0.807* 18.343* 17.052*   , and All pertinent lab results from the last 24 hours have been reviewed.    Significant Imaging: Echocardiogram: Transthoracic echo (TTE) complete (Cupid Only):   Results for orders placed or performed during the hospital encounter of 06/13/22   Echo   Result Value Ref Range    BSA 1.87 m2    TDI SEPTAL 0.09 m/s    LV LATERAL E/E' RATIO 6.75 m/s    LV SEPTAL E/E' RATIO 9.00 m/s    IVC diameter 1.80 cm    Left Ventricular Outflow Tract Mean Velocity 0.65358149597 cm/s    Left Ventricular Outflow Tract Mean Gradient 2.99 mmHg    TDI LATERAL 0.12 m/s    LVIDd 3.90 3.5 - 6.0 cm    IVS 1.31 (A) 0.6 - 1.1 cm    Posterior Wall 1.33 (A) 0.6 - 1.1 cm    Ao root annulus 3.29 cm    LVIDs 2.72 2.1 - 4.0 cm    FS 30 28 - 44 %    Sinus 3.67 cm    STJ 3.51 cm    Ascending aorta 3.49 cm    LV mass 183.98 g    LA size 3.32 cm    TAPSE 1.90 cm    Left Ventricle Relative Wall Thickness 0.68 cm    AV regurgitation pressure 1/2 time 922.146419950407609 ms    AV mean gradient 4 mmHg    AV valve area 2.66 cm2    AV Velocity Ratio 0.92     AV index (prosthetic) 0.88     MV valve area p 1/2 method 4.02 cm2    E/A ratio 0.98     Mean e' 0.11 m/s    E wave deceleration time 188.987653706746816 msec    IVRT 71.657621740694213 msec    LVOT diameter 1.96 cm    LVOT area 3.0 cm2    LVOT peak naresh 1.21 m/s    LVOT peak VTI 21.80 cm    Ao peak naresh 1.31 m/s    Ao VTI 24.7 cm    RVOT peak naresh 0.88 m/s    RVOT peak VTI 16.6 cm    LVOT stroke volume 65.74 cm3    AV peak gradient 7 mmHg    PV mean gradient 1.77 mmHg    E/E' ratio 7.71 m/s    MV Peak  E Josias 0.81 m/s    AR Max Josias 3.94 m/s    TR Max Josias 2.61 m/s    MV stenosis pressure 1/2 time 54.628040813014872 ms    MV Peak A Josias 0.83 m/s    LV Systolic Volume 27.58 mL    LV Systolic Volume Index 14.7 mL/m2    LV Diastolic Volume 65.94 mL    LV Diastolic Volume Index 35.26 mL/m2    LV Mass Index 98 g/m2    RA Major Axis 4.14 cm    Left Atrium Minor Axis 3.25 cm    Left Atrium Major Axis 4.85 cm    Triscuspid Valve Regurgitation Peak Gradient 27 mmHg    RA Width 3.76 cm    Right Atrial Pressure (from IVC) 8 mmHg    EF 50 %    TV rest pulmonary artery pressure 35 mmHg    Narrative    · The left ventricle is normal in size with concentric remodeling and low   normal systolic function.  · The estimated ejection fraction is 50%.  · Normal left ventricular diastolic function.  · There are segmental left ventricular wall motion abnormalities.  · Normal right ventricular size.  · Intermediate central venous pressure (8 mmHg).  · The estimated PA systolic pressure is 35 mmHg.      , EKG: Reviewed, and X-Ray: CXR: X-Ray Chest 1 View (CXR):   Results for orders placed or performed during the hospital encounter of 06/13/22   X-Ray Chest 1 View    Narrative    EXAMINATION:  XR CHEST 1 VIEW    CLINICAL HISTORY:  CP w/ STEMI;    TECHNIQUE:  Single frontal view of the chest was performed.    COMPARISON:  None    FINDINGS:  The lungs are clear, with normal appearance of pulmonary vasculature and no pleural effusion or pneumothorax.    The cardiac silhouette is borderline enlarged.  The hilar and mediastinal contours are unremarkable.    Bones are intact.      Impression    Borderline cardiomegaly.    No acute pulmonary opacity.      Electronically signed by: Nghia Richmond  Date:    06/13/2022  Time:    19:50    and X-Ray Chest PA and Lateral (CXR): No results found for this visit on 06/13/22.    Assessment and Plan:   Patient who presents with anterior STEMI s/p successful intervention.  Stable this AM. Continue OMT. Transfer  to telemetry.    * ST elevation myocardial infarction involving left anterior descending (LAD) coronary artery  ANTERIOR STEMI LAD DISTRIBUTION FOR DIRECT PCI WILL USE STANDARD THERAPY WITH DUAL ANTIPLATELETS STATINS B BLOCKERS.   WILL OBTAIN ECHO    6/14/22  -s/p [facility] with successful PCI of diagonal and LAD  -Chest pain free  -Continue BB, statin, Brilinta, ASA  -Ok to transfer to telemetry    Hyperglycemia, unspecified  -Monitor    Tobacco abuse  SMOKING CESSATION COUNSELING PERFORMED.     Uncontrolled hypertension  ON NO MEDICAL THERAPY WILL START B BLOCKERS.        VTE Risk Mitigation (From admission, onward)         Ordered     enoxaparin injection 40 mg  Daily         06/13/22 2876                Leisa Hernandez PA-C  Cardiology  [facility] - Intensive Care ([facility])

## 2022-06-14 NOTE — ASSESSMENT & PLAN NOTE
ANTERIOR STEMI LAD DISTRIBUTION FOR DIRECT PCI WILL USE STANDARD THERAPY WITH DUAL ANTIPLATELETS STATINS B BLOCKERS.   WILL OBTAIN ECHO    6/14/22  -s/p [facility] with successful PCI of diagonal and LAD  -Chest pain free  -Continue BB, statin, Brilinta, ASA  -Ok to transfer to telemetry

## 2022-06-14 NOTE — NURSING
Patient lying in bed with no complaints. Right groin sheath intact with no hematoma noted. Right lower extremity remain straight.

## 2022-06-15 ENCOUNTER — TELEPHONE (OUTPATIENT)
Dept: CARDIOLOGY | Facility: HOSPITAL | Age: 42
End: 2022-06-15
Payer: COMMERCIAL

## 2022-06-15 VITALS
TEMPERATURE: 98 F | OXYGEN SATURATION: 96 % | DIASTOLIC BLOOD PRESSURE: 75 MMHG | WEIGHT: 157.63 LBS | SYSTOLIC BLOOD PRESSURE: 111 MMHG | RESPIRATION RATE: 20 BRPM | HEART RATE: 82 BPM | BODY MASS INDEX: 23.35 KG/M2 | HEIGHT: 69 IN

## 2022-06-15 LAB
ALBUMIN SERPL BCP-MCNC: 3.8 G/DL (ref 3.5–5.2)
ALP SERPL-CCNC: 55 U/L (ref 55–135)
ALT SERPL W/O P-5'-P-CCNC: 28 U/L (ref 10–44)
ANION GAP SERPL CALC-SCNC: 8 MMOL/L (ref 8–16)
AST SERPL-CCNC: 45 U/L (ref 10–40)
BILIRUB SERPL-MCNC: 0.9 MG/DL (ref 0.1–1)
BUN SERPL-MCNC: 16 MG/DL (ref 6–20)
CALCIUM SERPL-MCNC: 9.3 MG/DL (ref 8.7–10.5)
CHLORIDE SERPL-SCNC: 104 MMOL/L (ref 95–110)
CHOLEST SERPL-MCNC: 179 MG/DL (ref 120–199)
CHOLEST/HDLC SERPL: 5.1 {RATIO} (ref 2–5)
CO2 SERPL-SCNC: 26 MMOL/L (ref 23–29)
CREAT SERPL-MCNC: 0.8 MG/DL (ref 0.5–1.4)
ERYTHROCYTE [DISTWIDTH] IN BLOOD BY AUTOMATED COUNT: 12.5 % (ref 11.5–14.5)
EST. GFR  (AFRICAN AMERICAN): >60 ML/MIN/1.73 M^2
EST. GFR  (NON AFRICAN AMERICAN): >60 ML/MIN/1.73 M^2
GLUCOSE SERPL-MCNC: 103 MG/DL (ref 70–110)
HCT VFR BLD AUTO: 43.8 % (ref 40–54)
HDLC SERPL-MCNC: 35 MG/DL (ref 40–75)
HDLC SERPL: 19.6 % (ref 20–50)
HGB BLD-MCNC: 14.4 G/DL (ref 14–18)
LDLC SERPL CALC-MCNC: 125.6 MG/DL (ref 63–159)
MCH RBC QN AUTO: 29.7 PG (ref 27–31)
MCHC RBC AUTO-ENTMCNC: 32.9 G/DL (ref 32–36)
MCV RBC AUTO: 90 FL (ref 82–98)
NONHDLC SERPL-MCNC: 144 MG/DL
PLATELET # BLD AUTO: 262 K/UL (ref 150–450)
PMV BLD AUTO: 9.8 FL (ref 9.2–12.9)
POCT GLUCOSE: 108 MG/DL (ref 70–110)
POCT GLUCOSE: 97 MG/DL (ref 70–110)
POTASSIUM SERPL-SCNC: 4.4 MMOL/L (ref 3.5–5.1)
PROT SERPL-MCNC: 6.8 G/DL (ref 6–8.4)
RBC # BLD AUTO: 4.85 M/UL (ref 4.6–6.2)
SODIUM SERPL-SCNC: 138 MMOL/L (ref 136–145)
TRIGL SERPL-MCNC: 92 MG/DL (ref 30–150)
WBC # BLD AUTO: 11.55 K/UL (ref 3.9–12.7)

## 2022-06-15 PROCEDURE — 99238 HOSP IP/OBS DSCHRG MGMT 30/<: CPT | Mod: ,,, | Performed by: PHYSICIAN ASSISTANT

## 2022-06-15 PROCEDURE — 25000003 PHARM REV CODE 250: Performed by: INTERNAL MEDICINE

## 2022-06-15 PROCEDURE — 80061 LIPID PANEL: CPT | Performed by: INTERNAL MEDICINE

## 2022-06-15 PROCEDURE — 80053 COMPREHEN METABOLIC PANEL: CPT

## 2022-06-15 PROCEDURE — 94761 N-INVAS EAR/PLS OXIMETRY MLT: CPT

## 2022-06-15 PROCEDURE — 99238 PR HOSPITAL DISCHARGE DAY,<30 MIN: ICD-10-PCS | Mod: ,,, | Performed by: PHYSICIAN ASSISTANT

## 2022-06-15 PROCEDURE — 25000003 PHARM REV CODE 250: Performed by: NURSE PRACTITIONER

## 2022-06-15 PROCEDURE — 85027 COMPLETE CBC AUTOMATED: CPT

## 2022-06-15 PROCEDURE — 36415 COLL VENOUS BLD VENIPUNCTURE: CPT | Performed by: INTERNAL MEDICINE

## 2022-06-15 RX ORDER — LOSARTAN POTASSIUM 25 MG/1
25 TABLET ORAL DAILY
Qty: 90 TABLET | Refills: 3 | Status: SHIPPED | OUTPATIENT
Start: 2022-06-16 | End: 2023-06-16

## 2022-06-15 RX ORDER — ASPIRIN 81 MG/1
81 TABLET ORAL DAILY
Qty: 90 TABLET | Refills: 3 | Status: SHIPPED | OUTPATIENT
Start: 2022-06-16 | End: 2023-06-16

## 2022-06-15 RX ORDER — ATORVASTATIN CALCIUM 80 MG/1
80 TABLET, FILM COATED ORAL DAILY
Qty: 90 TABLET | Refills: 3 | Status: SHIPPED | OUTPATIENT
Start: 2022-06-16 | End: 2023-06-16

## 2022-06-15 RX ORDER — METOPROLOL TARTRATE 25 MG/1
25 TABLET, FILM COATED ORAL 2 TIMES DAILY
Qty: 60 TABLET | Refills: 11 | Status: SHIPPED | OUTPATIENT
Start: 2022-06-15 | End: 2023-06-15

## 2022-06-15 RX ADMIN — METOPROLOL TARTRATE 25 MG: 25 TABLET, FILM COATED ORAL at 09:06

## 2022-06-15 RX ADMIN — ATORVASTATIN CALCIUM 80 MG: 40 TABLET, FILM COATED ORAL at 09:06

## 2022-06-15 RX ADMIN — FAMOTIDINE 20 MG: 20 TABLET ORAL at 09:06

## 2022-06-15 RX ADMIN — TICAGRELOR 90 MG: 90 TABLET ORAL at 09:06

## 2022-06-15 RX ADMIN — MUPIROCIN: 20 OINTMENT TOPICAL at 09:06

## 2022-06-15 RX ADMIN — LOSARTAN POTASSIUM 25 MG: 25 TABLET, FILM COATED ORAL at 09:06

## 2022-06-15 RX ADMIN — ASPIRIN 81 MG: 81 TABLET, COATED ORAL at 09:06

## 2022-06-15 NOTE — DISCHARGE SUMMARY
[facility] - Telemetry ([facility])  Cardiology  Discharge Summary      Patient Name: Kumar Rodirguez  MRN: [id number]  Admission Date: 6/13/2022  Hospital Length of Stay: 2 days  Discharge Date and Time:  06/15/2022 12:47 PM  Attending Physician: Thee Lucas MD    Discharging Provider: Leisa Hernandez PA-C  Primary Care Physician: Primary Doctor Marjorie    HPI:   A 40 yo MALE SMOKER WITH FH CAD HAD CHEST PAIN FELT LIKE CRAMP TIGHTNESS WENT TO AFTER HOURS HAD EKG DONE SHOWED STEMI HE WAS BROUGHT TO ER BY AMBULANCE REPEAT EKG SHOWED ANTERIOR ACUTE ST ELEVATION HE WAS TAKEN EMERGENTLY TO THE CATH LAD FOR DIRECT PCI.HER RECEIVED ASA B BLOCKERS IV AND HEPARIN BOLUS AND IVF AND WAS TAKEN EMERGENTLY TO CATH LAB.      Procedure(s) (LRB):  CATHETERIZATION, HEART, LEFT (Left)  IVUS, Coronary  Percutaneous coronary intervention (N/A)  STENT, DRUG ELUTING, MULTI VESSEL, CORONARY     Indwelling Lines/Drains at time of discharge:  Lines/Drains/Airways     None                 Hospital Course:  6/14/22-Patient seen and examined today, resting in bed s/p [facility] with successful PCI of LAD and diagonal. No chest pain. No SOB. No other CV complaints. Labs stable. Echo showed EF of 50%.     Patient seen and examined today by myself and Dr. Lucas and deemed suitable for discharge. No acute events overnight. Chest pain free. Right groin access site with moderate bruising, no hematoma, intact pulse. Patient will be discharged home on ASA, Brilinta, BB, ARB, and statin. He was extensively counseled on post-cath and post-MI restrictions and will need to f/u in clinic in 1 week.    Goals of Care Treatment Preferences:  Code Status: Full Code      Consults:   Consults (From admission, onward)        Status Ordering Provider     Inpatient consult to Critical Care Medicine  Once        Provider:  Thee Lucas MD    Completed THEE LUCAS          Significant Diagnostic Studies: Labs, LHC, EKG    Pending Diagnostic Studies:     Procedure Component  Value Units Date/Time    IR Heart Cath Images [[id number]]     Order Status: Sent Lab Status: No result     Lipid Panel [[id number]] Collected: 06/15/22 0441    Order Status: Sent Lab Status: In process Updated: 06/15/22 0441    Specimen: Blood           Final Active Diagnoses:    Diagnosis Date Noted POA    PRINCIPAL PROBLEM:  ST elevation myocardial infarction involving left anterior descending (LAD) coronary artery [I21.02] 06/13/2022 Yes    Uncontrolled hypertension [I10] 06/13/2022 Yes    Tobacco abuse [Z72.0] 06/13/2022 Yes     Chronic    Hyperglycemia, unspecified [R73.9] 06/13/2022 Yes      Problems Resolved During this Admission:     * ST elevation myocardial infarction involving left anterior descending (LAD) coronary artery  ANTERIOR STEMI LAD DISTRIBUTION FOR DIRECT PCI WILL USE STANDARD THERAPY WITH DUAL ANTIPLATELETS STATINS B BLOCKERS.   WILL OBTAIN ECHO    6/14/22  -s/p [facility] with successful PCI of diagonal and LAD  -Chest pain free  -Continue BB, statin, Brilinta, ASA  -Ok to transfer to telemetry    6/15/22  -Stable overnight  -No chest pain symptoms/angina  -Continue BB, statin, Brilinta, ASA, ARB  -Follow-up in clinic in 1-2 weeks    Hyperglycemia, unspecified  -Monitor    6/15/22  -A1C 5.7, counseled on diet  -Needs PCP f/u    Tobacco abuse  SMOKING CESSATION COUNSELING PERFORMED.     Uncontrolled hypertension  ON NO MEDICAL THERAPY WILL START B BLOCKERS.        Discharged Condition: good    Disposition: Home or Self Care    Follow Up:   Follow-up Information     Norma Nichols MD Follow up in 1 week(s).    Specialties: Interventional Cardiology, Cardiology  Contact information:  [address]  [place] [address] [phone]                       Patient Instructions:      Diet Cardiac     Other restrictions (specify):   Order Comments: No heavy bending or lifting  No strenuous activity  No driving for now  May shower, no baths/soaking in tub for 5 days     Notify your health care  provider if you experience any of the following:  temperature >100.4     Notify your health care provider if you experience any of the following:  persistent nausea and vomiting or diarrhea     Notify your health care provider if you experience any of the following:  severe uncontrolled pain     Notify your health care provider if you experience any of the following:  redness, tenderness, or signs of infection (pain, swelling, redness, odor or green/yellow discharge around incision site)     Notify your health care provider if you experience any of the following:  difficulty breathing or increased cough     Notify your health care provider if you experience any of the following:  severe persistent headache     Notify your health care provider if you experience any of the following:  worsening rash     Notify your health care provider if you experience any of the following:  persistent dizziness, light-headedness, or visual disturbances     Notify your health care provider if you experience any of the following:     Notify your health care provider if you experience any of the following:  increased confusion or weakness     No dressing needed     Activity as tolerated     Medications:  Reconciled Home Medications:      Medication List      START taking these medications    aspirin 81 MG EC tablet  Commonly known as: ECOTRIN  Take 1 tablet (81 mg total) by mouth once daily.  Start taking on: June 16, 2022     atorvastatin 80 MG tablet  Commonly known as: LIPITOR  Take 1 tablet (80 mg total) by mouth once daily.  Start taking on: June 16, 2022     losartan 25 MG tablet  Commonly known as: COZAAR  Take 1 tablet (25 mg total) by mouth once daily.  Start taking on: June 16, 2022     metoprolol tartrate 25 MG tablet  Commonly known as: LOPRESSOR  Take 1 tablet (25 mg total) by mouth 2 (two) times daily.     ticagrelor 90 mg tablet  Commonly known as: BRILINTA  Take 1 tablet (90 mg total) by mouth 2 (two) times daily.             Time spent on the discharge of patient: 35 minutes    Leisa Hernandez PA-C  Cardiology  [facility] - Tele[facility] ([facility])

## 2022-06-15 NOTE — PROGRESS NOTES
Patient discharged with belongings in hand. Wheeled down per hospital staff. Reinforced education, pt verbalized understanding. IV taken out, jelco intact. Telemetry monitor taken off. Medications reviewed with pt, verbalized understanding. NADN.   Education verbally understood and read back regarding medication.

## 2022-06-15 NOTE — PLAN OF CARE
Problem: Skin Injury Risk Increased  Goal: Skin Health and Integrity  Outcome: Ongoing, Progressing   Problem: Skin Injury Risk Increased  Goal: Skin Health and Integrity  Outcome: Ongoing, Progressing  Plan of care reviewed with patient, pt verbalized understanding.  Pt remains free from falls this shift, fall precautions in place.  Pt remains free from skin breakdown.  AAOx4, VSS, NAD noted at this time.  Pt remained afebrile.  NSR on the tele monitor.  Pt currently resting comfortably in bed.  Hourly rounding complete.  Cards following pt.  Will continue to monitor.  Plan to d/c home today.

## 2022-06-15 NOTE — ASSESSMENT & PLAN NOTE
ANTERIOR STEMI LAD DISTRIBUTION FOR DIRECT PCI WILL USE STANDARD THERAPY WITH DUAL ANTIPLATELETS STATINS B BLOCKERS.   WILL OBTAIN ECHO    6/14/22  -s/p [facility] with successful PCI of diagonal and LAD  -Chest pain free  -Continue BB, statin, Brilinta, ASA  -Ok to transfer to telemetry    6/15/22  -Stable overnight  -No chest pain symptoms/angina  -Continue BB, statin, Brilinta, ASA, ARB  -Follow-up in clinic in 1-2 weeks

## 2022-06-15 NOTE — PLAN OF CARE
[facility] - Telemetry (Hospital)  Discharge Final Note    Primary Care Provider: Primary Doctor No    Expected Discharge Date: 6/15/2022    Final Discharge Note (most recent)     Final Note - 06/15/22 1312        Final Note    Assessment Type Final Discharge Note     Anticipated Discharge Disposition Home or Self Care     Hospital Resources/Appts/Education Provided Appointments scheduled by Navigator/Coordinator                 Important Message from Medicare             Contact Info     Norma Nichols MD   Specialty: Interventional Cardiology, Cardiology    [address]  [place] [address]   Phone: [phone]       Next Steps: Follow up in 1 week(s)        PCP f/u 6/17  Message left with Dr. Nichols's clinic requesting 2 week f/u.

## 2022-06-17 ENCOUNTER — OFFICE VISIT (OUTPATIENT)
Dept: INTERNAL MEDICINE | Facility: CLINIC | Age: 42
End: 2022-06-17
Payer: COMMERCIAL

## 2022-06-17 ENCOUNTER — TELEPHONE (OUTPATIENT)
Dept: CARDIOLOGY | Facility: HOSPITAL | Age: 42
End: 2022-06-17
Payer: COMMERCIAL

## 2022-06-17 VITALS
OXYGEN SATURATION: 98 % | WEIGHT: 154.88 LBS | DIASTOLIC BLOOD PRESSURE: 74 MMHG | BODY MASS INDEX: 22.94 KG/M2 | SYSTOLIC BLOOD PRESSURE: 110 MMHG | TEMPERATURE: 98 F | HEIGHT: 69 IN | HEART RATE: 79 BPM | RESPIRATION RATE: 18 BRPM

## 2022-06-17 DIAGNOSIS — Z72.0 TOBACCO ABUSE: ICD-10-CM

## 2022-06-17 DIAGNOSIS — Z09 HOSPITAL DISCHARGE FOLLOW-UP: Primary | ICD-10-CM

## 2022-06-17 DIAGNOSIS — I21.02 ST ELEVATION MYOCARDIAL INFARCTION INVOLVING LEFT ANTERIOR DESCENDING (LAD) CORONARY ARTERY: ICD-10-CM

## 2022-06-17 DIAGNOSIS — R73.03 PREDIABETES: ICD-10-CM

## 2022-06-17 DIAGNOSIS — I10 UNCONTROLLED HYPERTENSION: ICD-10-CM

## 2022-06-17 DIAGNOSIS — R73.9 HYPERGLYCEMIA, UNSPECIFIED: ICD-10-CM

## 2022-06-17 PROCEDURE — 99999 PR PBB SHADOW E&M-EST. PATIENT-LVL IV: CPT | Mod: PBBFAC,,,

## 2022-06-17 PROCEDURE — 99204 PR OFFICE/OUTPT VISIT, NEW, LEVL IV, 45-59 MIN: ICD-10-PCS | Mod: S$GLB,,,

## 2022-06-17 PROCEDURE — 99204 OFFICE O/P NEW MOD 45 MIN: CPT | Mod: S$GLB,,,

## 2022-06-17 PROCEDURE — 99999 PR PBB SHADOW E&M-EST. PATIENT-LVL IV: ICD-10-PCS | Mod: PBBFAC,,,

## 2022-06-17 NOTE — PROGRESS NOTES
Kumar Rodriguez  06/17/2022  [id number]    Primary Doctor No  Patient Care Team:  Primary Doctor No as PCP - General          Visit Type:a scheduled visit following a recent hospitalization    Chief Complaint:  Chief Complaint   Patient presents with    Follow-up     Hospital follow up    Establish Care       History of Present Illness:    HPI:   A 40 yo MALE SMOKER WITH FH CAD HAD CHEST PAIN FELT LIKE CRAMP TIGHTNESS WENT TO AFTER HOURS HAD EKG DONE SHOWED STEMI HE WAS BROUGHT TO ER BY AMBULANCE REPEAT EKG SHOWED ANTERIOR ACUTE ST ELEVATION HE WAS TAKEN EMERGENTLY TO THE CATH LAD FOR DIRECT PCI.HER RECEIVED ASA B BLOCKERS IV AND HEPARIN BOLUS AND IVF AND WAS TAKEN EMERGENTLY TO CATH LAB.        Procedure(s) (LRB):  CATHETERIZATION, HEART, LEFT (Left)  IVUS, Coronary  Percutaneous coronary intervention (N/A)  STENT, DRUG ELUTING, MULTI VESSEL, CORONARY         Hospital Course:  6/14/22-Patient seen and examined today, resting in bed s/p [facility] with successful PCI of LAD and diagonal. No chest pain. No SOB. No other CV complaints. Labs stable. Echo showed EF of 50%.      Patient seen and examined today by myself and Dr. Nichols and deemed suitable for discharge. No acute events overnight. Chest pain free. Right groin access site with moderate bruising, no hematoma, intact pulse. Patient will be discharged home on ASA, Brilinta, BB, ARB, and statin. He was extensively counseled on post-cath and post-MI restrictions and will need to f/u in clinic in 1 week.     Transitional Care Note    Family and/or Caretaker present at visit?  Yes.  Diagnostic tests reviewed/disposition: I have reviewed all completed as well as pending diagnostic tests at the time of discharge.  Disease/illness education:   Home health/community services discussion/referrals: Patient does not have home health established from hospital visit.  They do not need home health.  If needed, we will set up home health for the patient.   Establishment or  re-establishment of referral orders for community resources: No other necessary community resources.   Discussion with other health care providers: No discussion with other health care providers necessary.     He use to see Dr. Nico Mcclelland with the [facility] physician group  Denies any medical problems prior to his heart attack    He owns a restaurant  He has been having trouble finding workers which has caused him extra stress  He closed his business for a short time while recovering from heart attack      History:  History reviewed. No pertinent past medical history.  Past Surgical History:   Procedure Laterality Date    LEFT HEART CATHETERIZATION Left 6/13/2022    Procedure: CATHETERIZATION, HEART, LEFT;  Surgeon: Thee Nichols MD;  Location: [facility] CATH LAB;  Service: Cardiology;  Laterality: Left;     Family History   Problem Relation Age of Onset    Heart disease Mother      Social History     Socioeconomic History    Marital status:    Tobacco Use    Smoking status: Current Every Day Smoker     Types: Cigarettes    Smokeless tobacco: Never Used     Patient Active Problem List   Diagnosis    ST elevation myocardial infarction involving left anterior descending (LAD) coronary artery    Uncontrolled hypertension    Tobacco abuse    Hyperglycemia, unspecified     Review of patient's allergies indicates:  No Known Allergies    The following were reviewed at this visit: active problem list, medication list, allergies, family history, social history, and health maintenance.    Medications:  Current Outpatient Medications on File Prior to Visit   Medication Sig Dispense Refill    aspirin (ECOTRIN) 81 MG EC tablet Take 1 tablet (81 mg total) by mouth once daily. 90 tablet 3    atorvastatin (LIPITOR) 80 MG tablet Take 1 tablet (80 mg total) by mouth once daily. 90 tablet 3    losartan (COZAAR) 25 MG tablet Take 1 tablet (25 mg total) by mouth once daily. 90 tablet 3    metoprolol tartrate (LOPRESSOR)  25 MG tablet Take 1 tablet (25 mg total) by mouth 2 (two) times daily. 60 tablet 11    ticagrelor (BRILINTA) 90 mg tablet Take 1 tablet (90 mg total) by mouth 2 (two) times daily. 60 tablet 11     No current facility-administered medications on file prior to visit.       Medications have been reviewed and reconciled with patient at this visit.  Barriers to medications reviewed with patient.    Adverse reactions to current medications reviewed with patient..    Over the counter medications reviewed and reconciled with patient.    Exam:  Wt Readings from Last 3 Encounters:   06/17/22 70.2 kg (154 lb 14 oz)   06/14/22 71.5 kg (157 lb 10.1 oz)     Temp Readings from Last 3 Encounters:   06/17/22 98.4 °F (36.9 °C) (Tympanic)   06/15/22 98.3 °F (36.8 °C) (Oral)     BP Readings from Last 3 Encounters:   06/17/22 110/74   06/15/22 111/75     Pulse Readings from Last 3 Encounters:   06/17/22 79   06/15/22 82     Body mass index is 22.87 kg/m².      Review of Systems   Constitutional: Negative.  Negative for chills and fever.   HENT: Negative.  Negative for congestion, sinus pain and sore throat.    Eyes: Negative for blurred vision and double vision.   Respiratory: Negative for cough, sputum production, shortness of breath and wheezing.    Cardiovascular: Positive for palpitations. Negative for chest pain and leg swelling.   Gastrointestinal: Negative for abdominal pain, constipation, diarrhea, heartburn, nausea and vomiting.   Genitourinary: Negative.    Musculoskeletal: Negative.    Skin: Negative.  Negative for rash.   Neurological: Negative.    Endo/Heme/Allergies: Negative.  Negative for polydipsia. Does not bruise/bleed easily.   Psychiatric/Behavioral: Negative for depression and substance abuse.     Physical Exam  Vitals and nursing note reviewed.   Constitutional:       General: He is not in acute distress.     Appearance: He is well-developed. He is not diaphoretic.   HENT:      Head: Normocephalic and atraumatic.       Right Ear: External ear normal.      Left Ear: External ear normal.      Nose: Nose normal.   Eyes:      General:         Right eye: No discharge.         Left eye: No discharge.      Conjunctiva/sclera: Conjunctivae normal.      Pupils: Pupils are equal, round, and reactive to light.   Neck:      Thyroid: No thyromegaly.   Cardiovascular:      Rate and Rhythm: Normal rate and regular rhythm.      Pulses: Normal pulses.      Heart sounds: Normal heart sounds. No murmur heard.  Pulmonary:      Effort: Pulmonary effort is normal. No respiratory distress.      Breath sounds: Normal breath sounds. No wheezing.   Abdominal:      General: Bowel sounds are normal.   Musculoskeletal:      Cervical back: Normal range of motion and neck supple.   Lymphadenopathy:      Cervical: No cervical adenopathy.   Skin:     Findings: Bruising present.   Neurological:      Mental Status: He is alert and oriented to person, place, and time.      Cranial Nerves: No cranial nerve deficit.   Psychiatric:         Behavior: Behavior normal.         Thought Content: Thought content normal.         Judgment: Judgment normal.         Laboratory Reviewed ({Yes)  Lab Results   Component Value Date    WBC 11.55 06/15/2022    HGB 14.4 06/15/2022    HCT 43.8 06/15/2022     06/15/2022    CHOL 179 06/15/2022    TRIG 92 06/15/2022    HDL 35 (L) 06/15/2022    ALT 28 06/15/2022    AST 45 (H) 06/15/2022     06/15/2022    K 4.4 06/15/2022     06/15/2022    CREATININE 0.8 06/15/2022    BUN 16 06/15/2022    CO2 26 06/15/2022    HGBA1C 5.7 (H) 06/14/2022       Kumar was seen today for follow-up and establish care.    Diagnoses and all orders for this visit:    Hospital discharge follow-up    Uncontrolled hypertension    ST elevation myocardial infarction involving left anterior descending (LAD) coronary artery    Hyperglycemia, unspecified     Lab Results   Component Value Date    HGBA1C 5.7 (H) 06/14/2022   cont working on diet to  decrease Blood glucose levels     Tobacco abuse    Prediabetes  Lab Results   Component Value Date    HGBA1C 5.7 (H) 06/14/2022   cont working on diet to decrease Blood glucose levels       Pt was discharged home on ASA, Lipitor, Cozaar, lopressor, brilinta  He has been taking medications as prescribed  States that he has stopped smoking  No CP at visit  Has an appt with cards on 6/21  Will schedule an est care appt with Dr. Lemus in a few weeks   Cont working on low fat diet and decreasing stress      Care Plan/Goals: Reviewed    Goals    None         Follow up: No follow-ups on file.    After visit summary was printed and given to patient upon discharge today.  Patient goals and care plan are included in After Visit Summary.

## 2022-06-19 ENCOUNTER — HOSPITAL ENCOUNTER (EMERGENCY)
Facility: HOSPITAL | Age: 42
Discharge: HOME | End: 2022-06-19
Attending: EMERGENCY MEDICINE
Payer: COMMERCIAL

## 2022-06-19 VITALS
TEMPERATURE: 98 F | BODY MASS INDEX: 23.16 KG/M2 | DIASTOLIC BLOOD PRESSURE: 72 MMHG | OXYGEN SATURATION: 99 % | SYSTOLIC BLOOD PRESSURE: 115 MMHG | RESPIRATION RATE: 18 BRPM | WEIGHT: 156.88 LBS | HEART RATE: 71 BPM

## 2022-06-19 DIAGNOSIS — T14.8XXA HEMATOMA: Primary | ICD-10-CM

## 2022-06-19 PROCEDURE — 99284 EMERGENCY DEPT VISIT MOD MDM: CPT | Mod: 25

## 2022-06-19 NOTE — ED NOTES
Pt ambulated out of ED without any complications. Pt verbalized understanding of discharge instructions

## 2022-06-19 NOTE — ED PROVIDER NOTES
SCRIBE #1 NOTE: I, Cheryl Noe, am scribing for, and in the presence of, Claudia Mcclelland MD. I have scribed the entire note.       History     Chief Complaint   Patient presents with    Wound Check     Pt reports incision at groin from heart cath/stent placement is hard to touch, wants to have incision checked     Review of patient's allergies indicates:  No Known Allergies      History of Present Illness     HPI    6/19/2022, 8:54 AM  History obtained from the patient      History of Present Illness: Kumar Rodriguez is a 41 y.o. male patient with a PMHx of acute STEMI who presents to the Emergency Department for evaluation of wound check which onset several days PTA. Symptoms are constant and moderate in severity. No mitigating or exacerbating factors reported. Pt had a STEMI on 6/13. Pt had a left heart catherization done and reports feeling incision pain at the groin. He states it is hard to touch.  Associated sxs include groin pain. Patient denies any fever, SOB, CP, HA, n/v/d, dysuria, and all other sxs at this time. No prior Tx reported. Pt has a cardiologist f/u appointment on 6/21. No further complaints or concerns at this time.       Arrival mode: Personal vehicle      PCP: Primary Doctor No        Past Medical History:  Past Medical History:   Diagnosis Date    High cholesterol     HTN (hypertension)     Myocardial infarction        Past Surgical History:  Past Surgical History:   Procedure Laterality Date    CARDIAC CATHETERIZATION      LEFT HEART CATHETERIZATION Left 06/13/2022    Procedure: CATHETERIZATION, HEART, LEFT;  Surgeon: Thee Nichols MD;  Location: [facility] CATH LAB;  Service: Cardiology;  Laterality: Left;         Family History:  Family History   Problem Relation Age of Onset    Heart disease Mother        Social History:  Social History     Tobacco Use    Smoking status: Current Every Day Smoker     Types: Cigarettes    Smokeless tobacco: Never Used   Substance and Sexual Activity     Alcohol use: Not Currently    Drug use: Not on file    Sexual activity: Not on file        Review of Systems     Review of Systems   Constitutional: Negative for fever.   HENT: Negative for sore throat.    Respiratory: Negative for shortness of breath.    Cardiovascular: Negative for chest pain.   Gastrointestinal: Negative for diarrhea, nausea and vomiting.   Genitourinary: Negative for dysuria.        (+) groin pain   Musculoskeletal: Negative for back pain.   Skin: Negative for rash.   Neurological: Negative for weakness and headaches.   Hematological: Does not bruise/bleed easily.   All other systems reviewed and are negative.       Physical Exam     Initial Vitals [06/19/22 0829]   BP Pulse Resp Temp SpO2   126/84 85 20 98.3 °F (36.8 °C) 97 %      MAP       --          Physical Exam  Nursing Notes and Vital Signs Reviewed.  Constitutional: Patient is in no acute distress. Well-developed and well-nourished.  Head: Atraumatic. Normocephalic.  Eyes: PERRL. EOM intact. Conjunctivae are not pale. No scleral icterus.  ENT: Mucous membranes are moist. Oropharynx is clear and symmetric.    Neck: Supple. Full ROM. No lymphadenopathy.  Cardiovascular: Regular rate. Regular rhythm. No murmurs, rubs, or gallops. Distal pulses are 2+ and symmetric.  Pulmonary/Chest: No respiratory distress. Clear to auscultation bilaterally. No wheezing or rales.  Abdominal: Soft and non-distended.  There is no tenderness.  No rebound, guarding, or rigidity. Good bowel sounds.  Genitourinary: No CVA tenderness. 4 x 2 cm enduration with ecchymosis in groin and upper thigh.   Musculoskeletal: Moves all extremities. No obvious deformities. No edema. No calf tenderness.  Skin: Warm and dry.  Neurological:  Alert, awake, and appropriate.  Normal speech.  No acute focal neurological deficits are appreciated.  Psychiatric: Normal affect. Good eye contact. Appropriate in content.     ED Course   Procedures  ED Vital Signs:  Vitals:    06/19/22  0829 06/19/22 0900 06/19/22 1000 06/19/22 1100   BP: 126/84 123/80 122/72 (!) 103/59   Pulse: 85 71 68 68   Resp: 20 20 18 (!) 24   Temp: 98.3 °F (36.8 °C)      TempSrc: Oral      SpO2: 97% 98% 97% 96%   Weight: 71.2 kg (156 lb 13.7 oz)       06/19/22 1200   BP: 115/72   Pulse: 71   Resp: 18   Temp:    TempSrc:    SpO2: 99%   Weight:        Abnormal Lab Results:  Labs Reviewed - No data to display     All Lab Results:      Imaging Results:  Imaging Results          US Soft Tissue, Groin Right (Final result)  Result time 06/19/22 11:22:06    Final result by Edgar Razo Jr., MD (06/19/22 11:22:06)                 Impression:      Small fluid collection within the area of concern likely relating to hematoma or seroma.  There is no evidence of pseudoaneurysm.      Electronically signed by: Edgar Razo Jr., MD  Date:    06/19/2022  Time:    11:22             Narrative:    EXAMINATION:  US SOFT TISSUE, GROIN RIGHT    CLINICAL HISTORY:  post angiogram swelling;    TECHNIQUE:  Multiple real-time grayscale and color Doppler images were obtained of the right groin region.    COMPARISON:  None    FINDINGS:  Within the subcutaneous tissues of the right inguinal region in the in area of palpable swelling is a small focus of fluid that measures 1.9 x 0.5 x 1.6 cm.  There is no internal vascular signal within this fluid.  The fluid is somewhat poorly defined and extends into the adjacent fat lobules other soft tissues.                                            The Emergency Provider reviewed the vital signs and test results, which are outlined above.     ED Discussion      Medical Decision Making:   Clinical Tests:   Radiological Study: Ordered and Reviewed   11:36 AM: Reassessed pt at this time. Discussed with pt all pertinent ED information and result. Discussed pt dx and plan of tx. Gave pt all f/u and return to the ED instructions. All questions and concerns were addressed at this time. Pt expresses understanding of  information and instructions, and is comfortable with plan to discharge. Pt is stable for discharge.    I discussed with patient and/or family/caretaker that evaluation in the ED does not suggest any emergent or life threatening medical conditions requiring immediate intervention beyond what was provided in the ED, and I believe patient is safe for discharge.  Regardless, an unremarkable evaluation in the ED does not preclude the development or presence of a serious of life threatening condition. As such, patient was instructed to return immediately for any worsening or change in current symptoms.        ED Medication(s):  Medications - No data to display    Discharge Medication List as of 6/19/2022 11:28 AM           Follow-up Information     [facility] In 2 days.    Specialty: Cardiology  Why: As scheduled  Contact information:  [id number] [facility] [id number] [phone]           [facility] - Emergency Dept..    Specialty: Emergency Medicine  Why: As needed, If symptoms worsen  Contact information:  [id number] [facility] [id number] [phone]                           Scribe Attestation:   Scribe #1: I performed the above scribed service and the documentation accurately describes the services I performed. I attest to the accuracy of the note.     Attending:   Physician Attestation Statement for Scribe #1: I, Claudia Mcclelland MD, personally performed the services described in this documentation, as scribed by Cheryl Noe, in my presence, and it is both accurate and complete.           Clinical Impression       ICD-10-CM ICD-9-CM   1. Hematoma  T14.8XXA 924.9       Disposition:   Disposition: Discharged  Condition: Stable       Claudia Mcclelland MD  06/21/22 0158

## 2022-06-21 ENCOUNTER — OFFICE VISIT (OUTPATIENT)
Dept: CARDIOLOGY | Facility: CLINIC | Age: 42
End: 2022-06-21
Payer: COMMERCIAL

## 2022-06-21 VITALS
OXYGEN SATURATION: 98 % | HEIGHT: 69 IN | BODY MASS INDEX: 23.51 KG/M2 | DIASTOLIC BLOOD PRESSURE: 72 MMHG | WEIGHT: 158.75 LBS | SYSTOLIC BLOOD PRESSURE: 116 MMHG | HEART RATE: 83 BPM

## 2022-06-21 DIAGNOSIS — Z72.0 TOBACCO ABUSE: ICD-10-CM

## 2022-06-21 DIAGNOSIS — R00.2 PALPITATIONS: Primary | ICD-10-CM

## 2022-06-21 DIAGNOSIS — Z95.5 S/P CORONARY ARTERY STENT PLACEMENT: ICD-10-CM

## 2022-06-21 DIAGNOSIS — I25.10 CORONARY ARTERY DISEASE, UNSPECIFIED VESSEL OR LESION TYPE, UNSPECIFIED WHETHER ANGINA PRESENT, UNSPECIFIED WHETHER NATIVE OR TRANSPLANTED HEART: ICD-10-CM

## 2022-06-21 PROCEDURE — 99999 PR PBB SHADOW E&M-EST. PATIENT-LVL III: CPT | Mod: PBBFAC,,, | Performed by: INTERNAL MEDICINE

## 2022-06-21 PROCEDURE — 99999 PR PBB SHADOW E&M-EST. PATIENT-LVL III: ICD-10-PCS | Mod: PBBFAC,,, | Performed by: INTERNAL MEDICINE

## 2022-06-21 PROCEDURE — 99214 PR OFFICE/OUTPT VISIT, EST, LEVL IV, 30-39 MIN: ICD-10-PCS | Mod: S$GLB,,, | Performed by: INTERNAL MEDICINE

## 2022-06-21 PROCEDURE — 99214 OFFICE O/P EST MOD 30 MIN: CPT | Mod: S$GLB,,, | Performed by: INTERNAL MEDICINE

## 2022-06-21 NOTE — PROGRESS NOTES
Subjective:   Patient ID:  Kumar Rodriguez is a 41 y.o. male who presents for evaluation of No chief complaint on file.      HPI  40 yo male, htn, ex smoker , hld, s/p LAD and diag stent two weeks ago   Comes in for follow up   Doing well no chest pain   Short palpitations, once or twice for few seconds a week   Had significant bruise s/p right femoral access, however US did not reveal PSA  States improving     Past Medical History:   Diagnosis Date    High cholesterol     HTN (hypertension)     Myocardial infarction        Past Surgical History:   Procedure Laterality Date    CARDIAC CATHETERIZATION      LEFT HEART CATHETERIZATION Left 06/13/2022    Procedure: CATHETERIZATION, HEART, LEFT;  Surgeon: Thee Nichols MD;  Location: [facility] CATH LAB;  Service: Cardiology;  Laterality: Left;       Social History     Tobacco Use    Smoking status: Former Smoker     Types: Cigarettes    Smokeless tobacco: Never Used   Substance Use Topics    Alcohol use: Not Currently       Family History   Problem Relation Age of Onset    Heart disease Mother        Review of Systems   Constitutional: Negative for fever and malaise/fatigue.   HENT: Negative for sore throat.    Eyes: Negative for blurred vision.   Cardiovascular: Negative for chest pain, claudication, cyanosis, dyspnea on exertion, irregular heartbeat, leg swelling, near-syncope, orthopnea, palpitations, paroxysmal nocturnal dyspnea and syncope.   Respiratory: Negative for cough and hemoptysis.    Hematologic/Lymphatic: Negative for bleeding problem.   Skin: Negative for rash.   Musculoskeletal: Negative for falls.   Gastrointestinal: Negative for abdominal pain.   Genitourinary: Negative.    Neurological: Negative.    Psychiatric/Behavioral: Negative for altered mental status and substance abuse.       Current Outpatient Medications on File Prior to Visit   Medication Sig    aspirin (ECOTRIN) 81 MG EC tablet Take 1 tablet (81 mg total) by mouth once daily.     atorvastatin (LIPITOR) 80 MG tablet Take 1 tablet (80 mg total) by mouth once daily.    losartan (COZAAR) 25 MG tablet Take 1 tablet (25 mg total) by mouth once daily.    metoprolol tartrate (LOPRESSOR) 25 MG tablet Take 1 tablet (25 mg total) by mouth 2 (two) times daily.    ticagrelor (BRILINTA) 90 mg tablet Take 1 tablet (90 mg total) by mouth 2 (two) times daily.     No current facility-administered medications on file prior to visit.       Objective:   Objective:  Wt Readings from Last 3 Encounters:   06/21/22 72 kg (158 lb 11.7 oz)   06/19/22 71.2 kg (156 lb 13.7 oz)   06/17/22 70.2 kg (154 lb 14 oz)     Temp Readings from Last 3 Encounters:   06/19/22 98.3 °F (36.8 °C) (Oral)   06/17/22 98.4 °F (36.9 °C) (Tympanic)   06/15/22 98.3 °F (36.8 °C) (Oral)     BP Readings from Last 3 Encounters:   06/21/22 116/72   06/19/22 115/72   06/17/22 110/74     Pulse Readings from Last 3 Encounters:   06/21/22 83   06/19/22 71   06/17/22 79       Physical Exam  Vitals reviewed.   Constitutional:       Appearance: He is well-developed.   HENT:      Head: Normocephalic and atraumatic.   Eyes:      General: No scleral icterus.     Conjunctiva/sclera: Conjunctivae normal.   Cardiovascular:      Rate and Rhythm: Normal rate and regular rhythm.      Pulses: Intact distal pulses.      Heart sounds: Normal heart sounds. No murmur heard.  Pulmonary:      Effort: No respiratory distress.      Breath sounds: No wheezing or rales.   Chest:      Chest wall: No tenderness.   Abdominal:      General: Bowel sounds are normal. There is no distension.      Palpations: Abdomen is soft.      Tenderness: There is no guarding.   Musculoskeletal:         General: Normal range of motion.      Cervical back: Normal range of motion and neck supple.   Skin:     General: Skin is warm.   Neurological:      Mental Status: He is alert and oriented to person, place, and time.         Lab Results   Component Value Date    CHOL 179 06/15/2022    CHOL 176  06/14/2022     Lab Results   Component Value Date    HDL 35 (L) 06/15/2022    HDL 30 (L) 06/14/2022     Lab Results   Component Value Date    LDLCALC 125.6 06/15/2022    LDLCALC 124.8 06/14/2022     Lab Results   Component Value Date    TRIG 92 06/15/2022    TRIG 106 06/14/2022     Lab Results   Component Value Date    CHOLHDL 19.6 (L) 06/15/2022    CHOLHDL 17.0 (L) 06/14/2022       Chemistry        Component Value Date/Time     06/15/2022 0441    K 4.4 06/15/2022 0441     06/15/2022 0441    CO2 26 06/15/2022 0441    BUN 16 06/15/2022 0441    CREATININE 0.8 06/15/2022 0441     06/15/2022 0441        Component Value Date/Time    CALCIUM 9.3 06/15/2022 0441    ALKPHOS 55 06/15/2022 0441    AST 45 (H) 06/15/2022 0441    ALT 28 06/15/2022 0441    BILITOT 0.9 06/15/2022 0441    ESTGFRAFRICA >60 06/15/2022 0441    EGFRNONAA >60 06/15/2022 0441          No results found for: TSH  No results found for: INR, PROTIME  Lab Results   Component Value Date    WBC 11.55 06/15/2022    HGB 14.4 06/15/2022    HCT 43.8 06/15/2022    MCV 90 06/15/2022     06/15/2022     BNP  @LABRCNTIP(BNP,BNPTRIAGEBLO)@  Estimated Creatinine Clearance: 119.1 mL/min (based on SCr of 0.8 mg/dL).     Imaging:  ======  Results for orders placed during the hospital encounter of 06/13/22    Echo    Interpretation Summary  · The left ventricle is normal in size with concentric remodeling and low normal systolic function.  · The estimated ejection fraction is 50%.  · Normal left ventricular diastolic function.  · There are segmental left ventricular wall motion abnormalities.  · Normal right ventricular size.  · Intermediate central venous pressure (8 mmHg).  · The estimated PA systolic pressure is 35 mmHg.    No results found for this or any previous visit.    No results found for this or any previous visit.    No results found for this or any previous visit.    No results found for this or any previous visit.    No valid procedures  specified.    Diagnostic Results:  ECG: Reviewed    Results for orders placed during the hospital encounter of 06/13/22    Cardiac catheterization    Conclusion  · A stent was successfully placed.  · The pre-procedure left ventricular end diastolic pressure was 20.  · The RPDA lesion was 50% stenosed.  · The Mid LAD lesion was 99% stenosed with 0% stenosis post-intervention.  · A stent was successfully placed at 16 HERRERA for 8 sec.  · A stent was successfully placed.  · The 1st Diag lesion was 90% stenosed with 0% stenosis post-intervention.  · The Dist LAD lesion was 50% stenosed.  · The estimated blood loss was none.  · There was two vessel coronary artery disease.  · This was a successful PCI for acute myocardial infarction.  · The Lat 1st Diag lesion was 90% stenosed with 0% stenosis post-intervention.  · A STENT RESOLUTE SENG [id number] stent was successfully placed.    The procedure log was documented by Documenter: Shantel Carlisle RN and verified by Norma Nichols MD.    Date: 6/13/2022  Time: 6:31 PM    The ASCVD Risk score (Jo-Ann DC Jr., et al., 2013) failed to calculate for the following reasons:    The patient has a prior MI or stroke diagnosis    Assessment and Plan:   Palpitations  -     Holter monitor - 24 hour; Future    Coronary artery disease, unspecified vessel or lesion type, unspecified whether angina present, unspecified whether native or transplanted heart    Tobacco abuse    S/P coronary artery stent placement    Reviewed all tests and above medical conditions with patient in detail and formulated treatment plan.  Risk factor modification discussed.   Cardiac low salt diet discussed.  Maintaining healthy weight and weight loss goals were discussed in clinic.  US no PSA  cw same current meds      Follow up in 6 months

## 2022-06-27 ENCOUNTER — OFFICE VISIT (OUTPATIENT)
Dept: INTERNAL MEDICINE | Facility: CLINIC | Age: 42
End: 2022-06-27
Payer: COMMERCIAL

## 2022-06-27 VITALS
OXYGEN SATURATION: 98 % | BODY MASS INDEX: 23.44 KG/M2 | HEART RATE: 67 BPM | SYSTOLIC BLOOD PRESSURE: 130 MMHG | TEMPERATURE: 99 F | DIASTOLIC BLOOD PRESSURE: 82 MMHG | HEIGHT: 69 IN

## 2022-06-27 DIAGNOSIS — I25.10 CORONARY ARTERY DISEASE, UNSPECIFIED VESSEL OR LESION TYPE, UNSPECIFIED WHETHER ANGINA PRESENT, UNSPECIFIED WHETHER NATIVE OR TRANSPLANTED HEART: ICD-10-CM

## 2022-06-27 DIAGNOSIS — R73.9 HYPERGLYCEMIA, UNSPECIFIED: ICD-10-CM

## 2022-06-27 DIAGNOSIS — I21.3 ST ELEVATION MYOCARDIAL INFARCTION (STEMI), UNSPECIFIED ARTERY: ICD-10-CM

## 2022-06-27 DIAGNOSIS — I10 UNCONTROLLED HYPERTENSION: ICD-10-CM

## 2022-06-27 DIAGNOSIS — Z00.00 ENCOUNTER FOR MEDICAL EXAMINATION TO ESTABLISH CARE: Primary | ICD-10-CM

## 2022-06-27 PROCEDURE — 99396 PR PREVENTIVE VISIT,EST,40-64: ICD-10-PCS | Mod: S$GLB,,, | Performed by: FAMILY MEDICINE

## 2022-06-27 PROCEDURE — 99999 PR PBB SHADOW E&M-EST. PATIENT-LVL III: CPT | Mod: PBBFAC,,, | Performed by: FAMILY MEDICINE

## 2022-06-27 PROCEDURE — 99396 PREV VISIT EST AGE 40-64: CPT | Mod: S$GLB,,, | Performed by: FAMILY MEDICINE

## 2022-06-27 PROCEDURE — 99999 PR PBB SHADOW E&M-EST. PATIENT-LVL III: ICD-10-PCS | Mod: PBBFAC,,, | Performed by: FAMILY MEDICINE

## 2022-06-27 NOTE — PROGRESS NOTES
Kumar Rodriguez  06/27/2022  [id number]    Primary Doctor No  Patient Care Team:  Primary Doctor No as PCP - General          Visit Type:a scheduled routine follow-up visit    Chief Complaint:  Chief Complaint   Patient presents with    Establish Care       History of Present Illness:  41 year old  Here for new patient exam    HTN, HLD, CAD, MI with Stent    Saw Cards- reported some palpitations.  Holter was ordered    Health Maintenance Due   Topic Date Due    Hepatitis C Screening  Never done    HIV Screening  Never done    TETANUS VACCINE  Never done       PreDM- HgA1c 5.7    On ASA Statin ARB and BB  On Brininta      History:  Past Medical History:   Diagnosis Date    High cholesterol     HTN (hypertension)     Myocardial infarction      Past Surgical History:   Procedure Laterality Date    CARDIAC CATHETERIZATION      LEFT HEART CATHETERIZATION Left 06/13/2022    Procedure: CATHETERIZATION, HEART, LEFT;  Surgeon: Thee Nichols MD;  Location: [facility] CATH LAB;  Service: Cardiology;  Laterality: Left;     Family History   Problem Relation Age of Onset    Heart disease Mother      Social History     Socioeconomic History    Marital status:    Tobacco Use    Smoking status: Former Smoker     Types: Cigarettes    Smokeless tobacco: Never Used   Substance and Sexual Activity    Alcohol use: Not Currently     Patient Active Problem List   Diagnosis    STEMI (ST elevation myocardial infarction)    Uncontrolled hypertension    Tobacco abuse    Hyperglycemia, unspecified    CAD (coronary artery disease)     Review of patient's allergies indicates:  No Known Allergies    The following were reviewed at this visit: active problem list, medication list, allergies, family history, social history, and health maintenance.    Medications:  Current Outpatient Medications on File Prior to Visit   Medication Sig Dispense Refill    aspirin (ECOTRIN) 81 MG EC tablet Take 1 tablet (81 mg total) by mouth once daily.  90 tablet 3    atorvastatin (LIPITOR) 80 MG tablet Take 1 tablet (80 mg total) by mouth once daily. 90 tablet 3    losartan (COZAAR) 25 MG tablet Take 1 tablet (25 mg total) by mouth once daily. 90 tablet 3    metoprolol tartrate (LOPRESSOR) 25 MG tablet Take 1 tablet (25 mg total) by mouth 2 (two) times daily. 60 tablet 11    ticagrelor (BRILINTA) 90 mg tablet Take 1 tablet (90 mg total) by mouth 2 (two) times daily. 60 tablet 11     No current facility-administered medications on file prior to visit.       Medications have been reviewed and reconciled with patient at this visit.  Barriers to medications reviewed with patient.    Adverse reactions to current medications reviewed with patient..    Over the counter medications reviewed and reconciled with patient.    Exam:  Wt Readings from Last 3 Encounters:   06/21/22 72 kg (158 lb 11.7 oz)   06/19/22 71.2 kg (156 lb 13.7 oz)   06/17/22 70.2 kg (154 lb 14 oz)     Temp Readings from Last 3 Encounters:   06/27/22 99 °F (37.2 °C) (Temporal)   06/19/22 98.3 °F (36.8 °C) (Oral)   06/17/22 98.4 °F (36.9 °C) (Tympanic)     BP Readings from Last 3 Encounters:   06/27/22 130/82   06/21/22 116/72   06/19/22 115/72     Pulse Readings from Last 3 Encounters:   06/27/22 67   06/21/22 83   06/19/22 71     Body mass index is 23.44 kg/m².      Review of Systems   Constitutional: Negative.  Negative for chills and fever.   HENT: Negative.  Negative for congestion, sinus pain and sore throat.    Eyes: Negative for blurred vision and double vision.   Respiratory: Negative for cough, sputum production, shortness of breath and wheezing.    Cardiovascular: Negative for chest pain, palpitations and leg swelling.   Gastrointestinal: Negative for abdominal pain, constipation, diarrhea, heartburn, nausea and vomiting.   Genitourinary: Negative.    Musculoskeletal: Negative.    Skin: Negative.  Negative for rash.   Neurological: Negative.    Endo/Heme/Allergies: Negative.  Negative for  polydipsia. Does not bruise/bleed easily.   Psychiatric/Behavioral: Negative for depression and substance abuse.     Physical Exam  Nursing note reviewed.   Cardiovascular:      Rate and Rhythm: Normal rate and regular rhythm.   Pulmonary:      Effort: Pulmonary effort is normal. No respiratory distress.   Skin:            Comments: Hematoma- moving down.     Neurological:      Mental Status: He is alert and oriented to person, place, and time.   Psychiatric:         Mood and Affect: Mood normal.         Behavior: Behavior normal.         Thought Content: Thought content normal.         Judgment: Judgment normal.         Laboratory Reviewed ({Yes)  Lab Results   Component Value Date    WBC 11.55 06/15/2022    HGB 14.4 06/15/2022    HCT 43.8 06/15/2022     06/15/2022    CHOL 179 06/15/2022    TRIG 92 06/15/2022    HDL 35 (L) 06/15/2022    ALT 28 06/15/2022    AST 45 (H) 06/15/2022     06/15/2022    K 4.4 06/15/2022     06/15/2022    CREATININE 0.8 06/15/2022    BUN 16 06/15/2022    CO2 26 06/15/2022    HGBA1C 5.7 (H) 06/14/2022       Kumar was seen today for establish care.    Diagnoses and all orders for this visit:    Encounter for medical examination to establish care    ST elevation myocardial infarction (STEMI), unspecified artery    Uncontrolled hypertension    Hyperglycemia, unspecified    Coronary artery disease, unspecified vessel or lesion type, unspecified whether angina present, unspecified whether native or transplanted heart    Continue meds     reviewed    Recheck HgA1c in 3 months  Lost weight  Stopped smoking      Hematoma. Okay  Still there.          Care Plan/Goals: Reviewed    Goals    None         Follow up: No follow-ups on file.    After visit summary was printed and given to patient upon discharge today.  Patient goals and care plan are included in After Visit Summary.

## (undated) DEVICE — CATH DIAG IMPULSE 6FR FL4

## (undated) DEVICE — WIRE GUIDE TEFLON 3CM .035 145

## (undated) DEVICE — CATH BLLN FG APEX MR 3.00X20MM

## (undated) DEVICE — KIT SYR REUSABLE

## (undated) DEVICE — GUIDE LAUNCHER 6FR EBU 3.5

## (undated) DEVICE — CATH NC QUANTUM APEX MR 4X12

## (undated) DEVICE — CATH REVOLUTION IVUS 45MHZ

## (undated) DEVICE — CATH IMPULSE PIGTAIL 6F 110CM

## (undated) DEVICE — PACK CATH LAB CUSTOM BR

## (undated) DEVICE — SHEATH INTRODUCER 6FR 11CM

## (undated) DEVICE — CATH INFINITI 4F 3DRC 100CM

## (undated) DEVICE — GUIDEWIRE SION BLK STR 190CM

## (undated) DEVICE — SUT SILK 2-0 PS 18IN BLACK

## (undated) DEVICE — CATH IMPULSE 6FR MULTI-PAK

## (undated) DEVICE — KIT MANIFOLD LOW PRESS TUBING

## (undated) DEVICE — VISIPAQUE 320 200ML +PK

## (undated) DEVICE — WIRE X-SUP CHOICE PT .014X182

## (undated) DEVICE — ANGIOTOUCH KIT

## (undated) DEVICE — CATH DIAG IMPULSE 6FR FR4